# Patient Record
Sex: MALE | URBAN - METROPOLITAN AREA
[De-identification: names, ages, dates, MRNs, and addresses within clinical notes are randomized per-mention and may not be internally consistent; named-entity substitution may affect disease eponyms.]

---

## 2021-06-17 LAB — SARS-COV-2, NAA: DETECTED

## 2021-06-22 ENCOUNTER — HOSPITAL ENCOUNTER (INPATIENT)
Age: 56
LOS: 6 days | Discharge: HOME | DRG: 177 | End: 2021-06-28
Attending: EMERGENCY MEDICINE | Admitting: INTERNAL MEDICINE
Payer: COMMERCIAL

## 2021-06-22 ENCOUNTER — APPOINTMENT (OUTPATIENT)
Dept: GENERAL RADIOLOGY | Age: 56
DRG: 177 | End: 2021-06-22
Attending: EMERGENCY MEDICINE
Payer: COMMERCIAL

## 2021-06-22 ENCOUNTER — APPOINTMENT (OUTPATIENT)
Dept: CT IMAGING | Age: 56
DRG: 177 | End: 2021-06-22
Attending: EMERGENCY MEDICINE
Payer: COMMERCIAL

## 2021-06-22 DIAGNOSIS — R79.89 POSITIVE D DIMER: ICD-10-CM

## 2021-06-22 DIAGNOSIS — R09.02 HYPOXIA: Primary | ICD-10-CM

## 2021-06-22 DIAGNOSIS — U07.1 COVID-19 VIRUS DETECTED: ICD-10-CM

## 2021-06-22 LAB
ALBUMIN SERPL-MCNC: 3.3 G/DL (ref 3.5–5)
ALBUMIN/GLOB SERPL: 0.9 {RATIO} (ref 1.1–2.2)
ALP SERPL-CCNC: 48 U/L (ref 45–117)
ALT SERPL-CCNC: 64 U/L (ref 12–78)
ANION GAP SERPL CALC-SCNC: 7 MMOL/L (ref 5–15)
APPEARANCE UR: CLEAR
AST SERPL-CCNC: 49 U/L (ref 15–37)
BACTERIA URNS QL MICRO: NEGATIVE /HPF
BASE DEFICIT BLD-SCNC: 1.2 MMOL/L
BASOPHILS # BLD: 0 K/UL (ref 0–0.1)
BASOPHILS NFR BLD: 0 % (ref 0–1)
BILIRUB SERPL-MCNC: 0.7 MG/DL (ref 0.2–1)
BILIRUB UR QL: NEGATIVE
BUN SERPL-MCNC: 11 MG/DL (ref 6–20)
BUN/CREAT SERPL: 12 (ref 12–20)
CA-I BLD-MCNC: 1.14 MMOL/L (ref 1.12–1.32)
CALCIUM SERPL-MCNC: 8.1 MG/DL (ref 8.5–10.1)
CHLORIDE BLD-SCNC: 98 MMOL/L (ref 100–108)
CHLORIDE SERPL-SCNC: 100 MMOL/L (ref 97–108)
CK SERPL-CCNC: 152 U/L (ref 39–308)
CO2 BLD-SCNC: 24 MMOL/L (ref 19–24)
CO2 SERPL-SCNC: 25 MMOL/L (ref 21–32)
COLOR UR: ABNORMAL
CREAT SERPL-MCNC: 0.95 MG/DL (ref 0.7–1.3)
CREAT UR-MCNC: 0.9 MG/DL (ref 0.6–1.3)
CRP SERPL-MCNC: 3.28 MG/DL (ref 0–0.6)
D DIMER PPP FEU-MCNC: 0.87 MG/L FEU (ref 0–0.65)
DIFFERENTIAL METHOD BLD: ABNORMAL
EOSINOPHIL # BLD: 0 K/UL (ref 0–0.4)
EOSINOPHIL NFR BLD: 0 % (ref 0–7)
EPITH CASTS URNS QL MICRO: ABNORMAL /LPF
ERYTHROCYTE [DISTWIDTH] IN BLOOD BY AUTOMATED COUNT: 12.3 % (ref 11.5–14.5)
GLOBULIN SER CALC-MCNC: 3.8 G/DL (ref 2–4)
GLUCOSE BLD STRIP.AUTO-MCNC: 175 MG/DL (ref 65–117)
GLUCOSE BLD STRIP.AUTO-MCNC: 209 MG/DL (ref 74–106)
GLUCOSE BLD STRIP.AUTO-MCNC: 263 MG/DL (ref 65–117)
GLUCOSE BLD STRIP.AUTO-MCNC: 317 MG/DL (ref 65–117)
GLUCOSE SERPL-MCNC: 184 MG/DL (ref 65–100)
GLUCOSE UR STRIP.AUTO-MCNC: NEGATIVE MG/DL
HCO3 BLDA-SCNC: 24 MMOL/L
HCT VFR BLD AUTO: 45.6 % (ref 36.6–50.3)
HGB BLD-MCNC: 15.4 G/DL (ref 12.1–17)
HGB UR QL STRIP: NEGATIVE
IMM GRANULOCYTES # BLD AUTO: 0.1 K/UL (ref 0–0.04)
IMM GRANULOCYTES NFR BLD AUTO: 1 % (ref 0–0.5)
KETONES UR QL STRIP.AUTO: ABNORMAL MG/DL
LACTATE BLD-SCNC: 1.61 MMOL/L (ref 0.4–2)
LEUKOCYTE ESTERASE UR QL STRIP.AUTO: NEGATIVE
LYMPHOCYTES # BLD: 2.3 K/UL (ref 0.8–3.5)
LYMPHOCYTES NFR BLD: 33 % (ref 12–49)
MCH RBC QN AUTO: 30.2 PG (ref 26–34)
MCHC RBC AUTO-ENTMCNC: 33.8 G/DL (ref 30–36.5)
MCV RBC AUTO: 89.4 FL (ref 80–99)
MONOCYTES # BLD: 0.7 K/UL (ref 0–1)
MONOCYTES NFR BLD: 10 % (ref 5–13)
NEUTS SEG # BLD: 3.9 K/UL (ref 1.8–8)
NEUTS SEG NFR BLD: 56 % (ref 32–75)
NITRITE UR QL STRIP.AUTO: NEGATIVE
NRBC # BLD: 0 K/UL (ref 0–0.01)
NRBC BLD-RTO: 0 PER 100 WBC
PCO2 BLDV: 39.1 MMHG (ref 41–51)
PH BLDV: 7.39 [PH] (ref 7.32–7.42)
PH UR STRIP: 6.5 [PH] (ref 5–8)
PLATELET # BLD AUTO: 151 K/UL (ref 150–400)
PMV BLD AUTO: 10.6 FL (ref 8.9–12.9)
PO2 BLDV: 29 MMHG (ref 25–40)
POTASSIUM BLD-SCNC: 3.9 MMOL/L (ref 3.5–5.5)
POTASSIUM SERPL-SCNC: 3.9 MMOL/L (ref 3.5–5.1)
PROCALCITONIN SERPL-MCNC: <0.05 NG/ML
PROT SERPL-MCNC: 7.1 G/DL (ref 6.4–8.2)
PROT UR STRIP-MCNC: 30 MG/DL
RBC # BLD AUTO: 5.1 M/UL (ref 4.1–5.7)
RBC #/AREA URNS HPF: ABNORMAL /HPF (ref 0–5)
SERVICE CMNT-IMP: ABNORMAL
SODIUM BLD-SCNC: 134 MMOL/L (ref 136–145)
SODIUM SERPL-SCNC: 132 MMOL/L (ref 136–145)
SP GR UR REFRACTOMETRY: <1.005 (ref 1–1.03)
SPECIMEN SITE: ABNORMAL
TROPONIN I SERPL-MCNC: <0.05 NG/ML
UA: UC IF INDICATED,UAUC: ABNORMAL
UROBILINOGEN UR QL STRIP.AUTO: 0.2 EU/DL (ref 0.2–1)
WBC # BLD AUTO: 6.9 K/UL (ref 4.1–11.1)
WBC URNS QL MICRO: ABNORMAL /HPF (ref 0–4)

## 2021-06-22 PROCEDURE — 93005 ELECTROCARDIOGRAM TRACING: CPT

## 2021-06-22 PROCEDURE — 87040 BLOOD CULTURE FOR BACTERIA: CPT

## 2021-06-22 PROCEDURE — 86140 C-REACTIVE PROTEIN: CPT

## 2021-06-22 PROCEDURE — 85025 COMPLETE CBC W/AUTO DIFF WBC: CPT

## 2021-06-22 PROCEDURE — 71045 X-RAY EXAM CHEST 1 VIEW: CPT

## 2021-06-22 PROCEDURE — 84484 ASSAY OF TROPONIN QUANT: CPT

## 2021-06-22 PROCEDURE — 74011636637 HC RX REV CODE- 636/637: Performed by: INTERNAL MEDICINE

## 2021-06-22 PROCEDURE — 84295 ASSAY OF SERUM SODIUM: CPT

## 2021-06-22 PROCEDURE — 74011250636 HC RX REV CODE- 250/636: Performed by: EMERGENCY MEDICINE

## 2021-06-22 PROCEDURE — 99285 EMERGENCY DEPT VISIT HI MDM: CPT

## 2021-06-22 PROCEDURE — 36415 COLL VENOUS BLD VENIPUNCTURE: CPT

## 2021-06-22 PROCEDURE — 74011250637 HC RX REV CODE- 250/637: Performed by: INTERNAL MEDICINE

## 2021-06-22 PROCEDURE — 85379 FIBRIN DEGRADATION QUANT: CPT

## 2021-06-22 PROCEDURE — XW033E5 INTRODUCTION OF REMDESIVIR ANTI-INFECTIVE INTO PERIPHERAL VEIN, PERCUTANEOUS APPROACH, NEW TECHNOLOGY GROUP 5: ICD-10-PCS | Performed by: INTERNAL MEDICINE

## 2021-06-22 PROCEDURE — 65270000029 HC RM PRIVATE

## 2021-06-22 PROCEDURE — 82962 GLUCOSE BLOOD TEST: CPT

## 2021-06-22 PROCEDURE — 96375 TX/PRO/DX INJ NEW DRUG ADDON: CPT

## 2021-06-22 PROCEDURE — 82550 ASSAY OF CK (CPK): CPT

## 2021-06-22 PROCEDURE — 96374 THER/PROPH/DIAG INJ IV PUSH: CPT

## 2021-06-22 PROCEDURE — 74011000636 HC RX REV CODE- 636: Performed by: EMERGENCY MEDICINE

## 2021-06-22 PROCEDURE — 81001 URINALYSIS AUTO W/SCOPE: CPT

## 2021-06-22 PROCEDURE — 74011250636 HC RX REV CODE- 250/636: Performed by: INTERNAL MEDICINE

## 2021-06-22 PROCEDURE — 87449 NOS EACH ORGANISM AG IA: CPT

## 2021-06-22 PROCEDURE — 71275 CT ANGIOGRAPHY CHEST: CPT

## 2021-06-22 PROCEDURE — 84145 PROCALCITONIN (PCT): CPT

## 2021-06-22 PROCEDURE — 80053 COMPREHEN METABOLIC PANEL: CPT

## 2021-06-22 RX ORDER — DEXAMETHASONE 4 MG/1
6 TABLET ORAL DAILY
Status: DISCONTINUED | OUTPATIENT
Start: 2021-06-22 | End: 2021-06-23

## 2021-06-22 RX ORDER — SODIUM CHLORIDE 0.9 % (FLUSH) 0.9 %
5-10 SYRINGE (ML) INJECTION AS NEEDED
Status: DISCONTINUED | OUTPATIENT
Start: 2021-06-22 | End: 2021-06-26

## 2021-06-22 RX ORDER — LEVOTHYROXINE SODIUM 88 UG/1
88 TABLET ORAL DAILY
COMMUNITY

## 2021-06-22 RX ORDER — ONDANSETRON 4 MG/1
4 TABLET, ORALLY DISINTEGRATING ORAL
Status: DISCONTINUED | OUTPATIENT
Start: 2021-06-22 | End: 2021-06-28 | Stop reason: HOSPADM

## 2021-06-22 RX ORDER — ASCORBIC ACID 500 MG
1000 TABLET ORAL DAILY
COMMUNITY

## 2021-06-22 RX ORDER — INSULIN LISPRO 100 [IU]/ML
INJECTION, SOLUTION INTRAVENOUS; SUBCUTANEOUS
Status: DISCONTINUED | OUTPATIENT
Start: 2021-06-22 | End: 2021-06-28 | Stop reason: HOSPADM

## 2021-06-22 RX ORDER — MELATONIN
1000 DAILY
Status: DISCONTINUED | OUTPATIENT
Start: 2021-06-22 | End: 2021-06-28 | Stop reason: HOSPADM

## 2021-06-22 RX ORDER — ACETAMINOPHEN 325 MG/1
650 TABLET ORAL
Status: DISCONTINUED | OUTPATIENT
Start: 2021-06-22 | End: 2021-06-28 | Stop reason: HOSPADM

## 2021-06-22 RX ORDER — ONDANSETRON 2 MG/ML
4 INJECTION INTRAMUSCULAR; INTRAVENOUS
Status: DISCONTINUED | OUTPATIENT
Start: 2021-06-22 | End: 2021-06-28 | Stop reason: HOSPADM

## 2021-06-22 RX ORDER — ATORVASTATIN CALCIUM 40 MG/1
40 TABLET, FILM COATED ORAL
Status: DISCONTINUED | OUTPATIENT
Start: 2021-06-22 | End: 2021-06-28 | Stop reason: HOSPADM

## 2021-06-22 RX ORDER — DIPHENHYDRAMINE HYDROCHLORIDE 50 MG/ML
25 INJECTION, SOLUTION INTRAMUSCULAR; INTRAVENOUS
Status: COMPLETED | OUTPATIENT
Start: 2021-06-22 | End: 2021-06-22

## 2021-06-22 RX ORDER — DEXTROSE 50 % IN WATER (D50W) INTRAVENOUS SYRINGE
12.5-25 AS NEEDED
Status: DISCONTINUED | OUTPATIENT
Start: 2021-06-22 | End: 2021-06-28 | Stop reason: HOSPADM

## 2021-06-22 RX ORDER — DEXAMETHASONE SODIUM PHOSPHATE 4 MG/ML
10 INJECTION, SOLUTION INTRA-ARTICULAR; INTRALESIONAL; INTRAMUSCULAR; INTRAVENOUS; SOFT TISSUE ONCE
Status: COMPLETED | OUTPATIENT
Start: 2021-06-22 | End: 2021-06-22

## 2021-06-22 RX ORDER — ATORVASTATIN CALCIUM 20 MG/1
20 TABLET, FILM COATED ORAL DAILY
COMMUNITY

## 2021-06-22 RX ORDER — ENOXAPARIN SODIUM 100 MG/ML
40 INJECTION SUBCUTANEOUS EVERY 24 HOURS
Status: DISCONTINUED | OUTPATIENT
Start: 2021-06-22 | End: 2021-06-28 | Stop reason: HOSPADM

## 2021-06-22 RX ORDER — MAGNESIUM SULFATE 100 %
4 CRYSTALS MISCELLANEOUS AS NEEDED
Status: DISCONTINUED | OUTPATIENT
Start: 2021-06-22 | End: 2021-06-28 | Stop reason: HOSPADM

## 2021-06-22 RX ORDER — SERTRALINE HYDROCHLORIDE 50 MG/1
50 TABLET, FILM COATED ORAL DAILY
Status: DISCONTINUED | OUTPATIENT
Start: 2021-06-22 | End: 2021-06-22

## 2021-06-22 RX ORDER — ASPIRIN 81 MG/1
81 TABLET ORAL DAILY
Status: DISCONTINUED | OUTPATIENT
Start: 2021-06-23 | End: 2021-06-28 | Stop reason: HOSPADM

## 2021-06-22 RX ORDER — METOCLOPRAMIDE HYDROCHLORIDE 5 MG/ML
10 INJECTION INTRAMUSCULAR; INTRAVENOUS
Status: COMPLETED | OUTPATIENT
Start: 2021-06-22 | End: 2021-06-22

## 2021-06-22 RX ORDER — BUTALBITAL, ACETAMINOPHEN AND CAFFEINE 50; 325; 40 MG/1; MG/1; MG/1
1 TABLET ORAL
Status: DISCONTINUED | OUTPATIENT
Start: 2021-06-22 | End: 2021-06-28 | Stop reason: HOSPADM

## 2021-06-22 RX ORDER — ACETAMINOPHEN 650 MG/1
650 SUPPOSITORY RECTAL
Status: DISCONTINUED | OUTPATIENT
Start: 2021-06-22 | End: 2021-06-28 | Stop reason: HOSPADM

## 2021-06-22 RX ORDER — POLYETHYLENE GLYCOL 3350 17 G/17G
17 POWDER, FOR SOLUTION ORAL DAILY PRN
Status: DISCONTINUED | OUTPATIENT
Start: 2021-06-22 | End: 2021-06-28 | Stop reason: HOSPADM

## 2021-06-22 RX ORDER — ASCORBIC ACID 500 MG
250 TABLET ORAL 2 TIMES DAILY
Status: DISCONTINUED | OUTPATIENT
Start: 2021-06-22 | End: 2021-06-28 | Stop reason: HOSPADM

## 2021-06-22 RX ORDER — LEVOTHYROXINE SODIUM 88 UG/1
88 TABLET ORAL
Status: DISCONTINUED | OUTPATIENT
Start: 2021-06-23 | End: 2021-06-28 | Stop reason: HOSPADM

## 2021-06-22 RX ORDER — ASPIRIN 81 MG/1
81 TABLET ORAL DAILY
COMMUNITY

## 2021-06-22 RX ORDER — METFORMIN HYDROCHLORIDE 500 MG/1
500 TABLET ORAL DAILY
Status: ON HOLD | COMMUNITY
End: 2021-06-28 | Stop reason: SDUPTHER

## 2021-06-22 RX ORDER — ZINC SULFATE 50(220)MG
50 CAPSULE ORAL DAILY
Status: DISCONTINUED | OUTPATIENT
Start: 2021-06-22 | End: 2021-06-28 | Stop reason: HOSPADM

## 2021-06-22 RX ORDER — SODIUM CHLORIDE 0.9 % (FLUSH) 0.9 %
5-40 SYRINGE (ML) INJECTION AS NEEDED
Status: DISCONTINUED | OUTPATIENT
Start: 2021-06-22 | End: 2021-06-28 | Stop reason: HOSPADM

## 2021-06-22 RX ORDER — SODIUM CHLORIDE 0.9 % (FLUSH) 0.9 %
5-40 SYRINGE (ML) INJECTION EVERY 8 HOURS
Status: DISCONTINUED | OUTPATIENT
Start: 2021-06-22 | End: 2021-06-28 | Stop reason: HOSPADM

## 2021-06-22 RX ORDER — LANOLIN ALCOHOL/MO/W.PET/CERES
1000 CREAM (GRAM) TOPICAL DAILY
COMMUNITY

## 2021-06-22 RX ORDER — KETOROLAC TROMETHAMINE 30 MG/ML
30 INJECTION, SOLUTION INTRAMUSCULAR; INTRAVENOUS
Status: COMPLETED | OUTPATIENT
Start: 2021-06-22 | End: 2021-06-22

## 2021-06-22 RX ADMIN — IOPAMIDOL 100 ML: 755 INJECTION, SOLUTION INTRAVENOUS at 10:21

## 2021-06-22 RX ADMIN — SODIUM CHLORIDE 983 ML: 9 INJECTION, SOLUTION INTRAVENOUS at 11:05

## 2021-06-22 RX ADMIN — KETOROLAC TROMETHAMINE 30 MG: 30 INJECTION, SOLUTION INTRAMUSCULAR; INTRAVENOUS at 08:59

## 2021-06-22 RX ADMIN — OXYCODONE HYDROCHLORIDE AND ACETAMINOPHEN 250 MG: 500 TABLET ORAL at 11:04

## 2021-06-22 RX ADMIN — Medication 10 ML: at 22:33

## 2021-06-22 RX ADMIN — ENOXAPARIN SODIUM 40 MG: 40 INJECTION SUBCUTANEOUS at 12:44

## 2021-06-22 RX ADMIN — SODIUM CHLORIDE 1000 ML: 9 INJECTION, SOLUTION INTRAVENOUS at 08:57

## 2021-06-22 RX ADMIN — INSULIN LISPRO 2 UNITS: 100 INJECTION, SOLUTION INTRAVENOUS; SUBCUTANEOUS at 12:44

## 2021-06-22 RX ADMIN — INSULIN LISPRO 4 UNITS: 100 INJECTION, SOLUTION INTRAVENOUS; SUBCUTANEOUS at 22:33

## 2021-06-22 RX ADMIN — CHOLECALCIFEROL TAB 25 MCG (1000 UNIT) 1000 UNITS: 25 TAB at 12:45

## 2021-06-22 RX ADMIN — DEXAMETHASONE SODIUM PHOSPHATE 10 MG: 4 INJECTION, SOLUTION INTRAMUSCULAR; INTRAVENOUS at 08:58

## 2021-06-22 RX ADMIN — ZINC SULFATE 220 MG (50 MG) CAPSULE 50 MG: CAPSULE at 16:22

## 2021-06-22 RX ADMIN — DEXAMETHASONE 6 MG: 4 TABLET ORAL at 11:04

## 2021-06-22 RX ADMIN — INSULIN LISPRO 5 UNITS: 100 INJECTION, SOLUTION INTRAVENOUS; SUBCUTANEOUS at 16:47

## 2021-06-22 RX ADMIN — METOCLOPRAMIDE 10 MG: 5 INJECTION, SOLUTION INTRAMUSCULAR; INTRAVENOUS at 08:58

## 2021-06-22 RX ADMIN — DIPHENHYDRAMINE HYDROCHLORIDE 25 MG: 50 INJECTION, SOLUTION INTRAMUSCULAR; INTRAVENOUS at 08:58

## 2021-06-22 RX ADMIN — Medication 10 ML: at 14:00

## 2021-06-22 RX ADMIN — ATORVASTATIN CALCIUM 40 MG: 40 TABLET, FILM COATED ORAL at 22:33

## 2021-06-22 RX ADMIN — ACETAMINOPHEN 650 MG: 325 TABLET ORAL at 16:49

## 2021-06-22 NOTE — ED NOTES
TRANSFER - OUT REPORT:    Verbal report given to Darrian Henson (name) on Cindi Klinefelter  being transferred to [facility] (unit) for routine progression of care       Report consisted of patients Situation, Background, Assessment and   Recommendations(SBAR). Information from the following report(s) SBAR, Kardex, ED Summary and MAR was reviewed with the receiving nurse. Lines:   Peripheral IV 06/22/21 Right Antecubital (Active)   Site Assessment Clean, dry, & intact 06/22/21 0817   Phlebitis Assessment 0 06/22/21 0817   Infiltration Assessment 0 06/22/21 0817   Dressing Status Clean, dry, & intact 06/22/21 0817   Hub Color/Line Status Pink 06/22/21 0817   Action Taken Blood drawn 06/22/21 0817       Peripheral IV 06/22/21 Left Antecubital (Active)        Opportunity for questions and clarification was provided.       Patient transported with:   O2 @ 2 liters

## 2021-06-22 NOTE — PROGRESS NOTES
End of Shift Note    Bedside shift change report given to Noam Hall RN(oncoming nurse) by Vanessa Ernst (offgoing nurse). Report included the following information SBAR, Kardex, Intake/Output, MAR and Recent Results    Shift worked:  [id number]     Shift summary and any significant changes:     Pt states headache has resolved since adm to Med[facility]Tele. O2 sats~95% on 2L/NC. Pt c/o SOB without oxygen     Concerns for physician to address:       Zone phone for oncoming shift:          Activity:  Activity Level: Up with Assistance  Number times ambulated in hallways past shift: 0  Number of times OOB to chair past shift: 0    Cardiac:   Cardiac Monitoring: No      Cardiac Rhythm: Sinus Tach    Access:   Current line(s): PIV     Genitourinary:   Urinary status: voiding    Respiratory:   O2 Device: Nasal cannula  Chronic home O2 use?: NO  Incentive spirometer at bedside: NO     GI:  Last Bowel Movement Date: 06/22/21  Current diet:  ADULT DIET Regular  Passing flatus: Tolerating current diet: YES       Pain Management:   Patient states pain is manageable on current regimen: YES    Skin:  Keven Score: 22  Interventions: increase time out of bed and nutritional support     Patient Safety:  Fall Score:  Total Score: 1  Interventions: gripper socks and stay with me (per policy)       Length of Stay:  Expected LOS: 5d 9h  Actual LOS: 0      Vanessa Ernst

## 2021-06-22 NOTE — ED PROVIDER NOTES
EMERGENCY DEPARTMENT HISTORY AND PHYSICAL EXAM      Date: 6/22/2021  Patient Name: Sunny Bowen    History of Presenting Illness     Chief Complaint   Patient presents with    Positive For Covid-19     Sx started monday. [facility] and was dx with Covid. He comes in today for worsening sob and worsening headache.  Shortness of Breath    Headache       History Provided By: Patient    HPI: Sunny Bowen, 64 y.o. male presents to the ED with no history of receiving a COVID-19 vaccine with URI symptoms starting approximately 1 week prior. 3 days later he went [facility] a [facility] and was diagnosed as having COVID-19. His symptoms worsened over the course of the next few days with worsening shortness of breath, myalgia and worsening headache, prompting visit to emergency department today. He presents to our emergency department 88% on room air with increased respiratory rate. He is a current everyday smoker but with no known specific exposure to COVID-19 that he is aware of. There are no other complaints, changes, or physical findings at this time. PCP: None    No current facility-administered medications on file prior to encounter. Current Outpatient Medications on File Prior to Encounter   Medication Sig Dispense Refill    multivitamin (ONE A DAY) tablet Take 1 Tab by mouth daily.  DOCOSAHEXANOIC ACID/EPA (FISH OIL PO) Take  by mouth.  levothyroxine (LEVOTHROID) 75 mcg tablet Take  by mouth Daily (before breakfast).  sertraline (ZOLOFT) 50 mg tablet Take  by mouth daily.  Cholecalciferol, Vitamin D3, (VITAMIN D3) 1,000 unit cap Take  by mouth.          Past History     Past Medical History:  Past Medical History:   Diagnosis Date    Hypothyroidism     Mood disorder ([facility] [address]        Past Surgical History:  Past Surgical History:   Procedure Laterality Date    HX KNEE ARTHROSCOPY      HX SHOULDER ARTHROSCOPY      HX TONSILLECTOMY Family History:  Family History   Problem Relation Age of Onset    Heart Disease Unknown     Diabetes Unknown     Depression Unknown        Social History:  Social History     Tobacco Use    Smoking status: Current Every Day Smoker    Smokeless tobacco: Never Used   Substance Use Topics    Alcohol use: No    Drug use: Not on file       Allergies:  No Known Allergies      Review of Systems   Review of Systems   Constitutional: Positive for activity change, appetite change, fatigue and fever. HENT: Negative. Respiratory: Positive for cough, chest tightness and shortness of breath. Gastrointestinal: Negative. Genitourinary: Negative. Musculoskeletal: Positive for myalgias. Negative for back pain. Skin: Negative for rash. Neurological: Positive for headaches. All other systems reviewed and are negative. Physical Exam   Physical Exam   Vital signs and nursing notes reviewed    CONSTITUTIONAL: Alert, in mild distress; well-developed; well-nourished. Appears fatigued. HEAD:  Normocephalic, atraumatic  EYES: PERRL; EOM's intact. ENTM: Nose: no rhinorrhea; Throat: no erythema or exudate, mucous membranes moist  Neck:  Supple. trachea is midline. RESP: Respiratory rate 24, increased work of breathing, 89% on room air  CV: S1 and S2 WNL; No murmurs, gallops or rubs. 2+ radial and DP pulses bilaterally. GI: non-distended, normal bowel sounds, abdomen soft and non-tender. No masses or organomegaly. : No costo-vertebral angle tenderness. BACK:  Non-tender, normal appearance  UPPER EXT:  Normal inspection. no joint or soft tissue swelling  LOWER EXT: No edema, no calf tenderness. NEURO: Alert and oriented x3, 5/5 strength and light touch sensation intact in bilateral upper and lower extremities. SKIN: No rashes;  Warm and dry  PSYCH: Normal mood, normal affect    Diagnostic Study Results     Labs -     Recent Results (from the past 12 hour(s))   CBC WITH AUTOMATED DIFF    Collection Time: 06/22/21  8:13 AM   Result Value Ref Range    WBC 6.9 4.1 - 11.1 K/uL    RBC 5.10 4. 10 - 5.70 M/uL    HGB 15.4 12.1 - 17.0 g/dL    HCT 45.6 36.6 - 50.3 %    MCV 89.4 80.0 - 99.0 FL    MCH 30.2 26.0 - 34.0 PG    MCHC 33.8 30.0 - 36.5 g/dL    RDW 12.3 11.5 - 14.5 %    PLATELET 144 737 - 645 K/uL    MPV 10.6 8.9 - 12.9 FL    NRBC 0.0 0  WBC    ABSOLUTE NRBC 0.00 0.00 - 0.01 K/uL    NEUTROPHILS 56 32 - 75 %    LYMPHOCYTES 33 12 - 49 %    MONOCYTES 10 5 - 13 %    EOSINOPHILS 0 0 - 7 %    BASOPHILS 0 0 - 1 %    IMMATURE GRANULOCYTES 1 (H) 0.0 - 0.5 %    ABS. NEUTROPHILS 3.9 1.8 - 8.0 K/UL    ABS. LYMPHOCYTES 2.3 0.8 - 3.5 K/UL    ABS. MONOCYTES 0.7 0.0 - 1.0 K/UL    ABS. EOSINOPHILS 0.0 0.0 - 0.4 K/UL    ABS. BASOPHILS 0.0 0.0 - 0.1 K/UL    ABS. IMM. GRANS. 0.1 (H) 0.00 - 0.04 K/UL    DF AUTOMATED     METABOLIC PANEL, COMPREHENSIVE    Collection Time: 06/22/21  8:13 AM   Result Value Ref Range    Sodium 132 (L) 136 - 145 mmol/L    Potassium 3.9 3.5 - 5.1 mmol/L    Chloride 100 97 - 108 mmol/L    CO2 25 21 - 32 mmol/L    Anion gap 7 5 - 15 mmol/L    Glucose 184 (H) 65 - 100 mg/dL    BUN 11 6 - 20 MG/DL    Creatinine 0.95 0.70 - 1.30 MG/DL    BUN/Creatinine ratio 12 12 - 20      GFR est AA >60 >60 ml/min/1.73m2    GFR est non-AA >60 >60 ml/min/1.73m2    Calcium 8.1 (L) 8.5 - 10.1 MG/DL    Bilirubin, total 0.7 0.2 - 1.0 MG/DL    ALT (SGPT) 64 12 - 78 U/L    AST (SGOT) 49 (H) 15 - 37 U/L    Alk.  phosphatase 48 45 - 117 U/L    Protein, total 7.1 6.4 - 8.2 g/dL    Albumin 3.3 (L) 3.5 - 5.0 g/dL    Globulin 3.8 2.0 - 4.0 g/dL    A-G Ratio 0.9 (L) 1.1 - 2.2     CK W/ REFLX CKMB    Collection Time: 06/22/21  8:13 AM   Result Value Ref Range     39 - 308 U/L   TROPONIN I    Collection Time: 06/22/21  8:13 AM   Result Value Ref Range    Troponin-I, Qt. <0.05 <0.05 ng/mL   D DIMER    Collection Time: 06/22/21  8:30 AM   Result Value Ref Range    D-dimer 0.87 (H) 0.00 - 0.65 mg/L FEU   BLOOD GAS,CHEM8,LACTIC ACID POC    Collection Time: 06/22/21  8:36 AM   Result Value Ref Range    Calcium, ionized (POC) 1.14 1.12 - 1.32 mmol/L    BICARBONATE 24 mmol/L    Base deficit (POC) 1.2 mmol/L    Sample source VENOUS BLOOD      CO2, POC 24 19 - 24 MMOL/L    Sodium,  (L) 136 - 145 MMOL/L    Potassium, POC 3.9 3.5 - 5.5 MMOL/L    Chloride, POC 98 (L) 100 - 108 MMOL/L    Glucose,  (H) 74 - 106 MG/DL    Creatinine, POC 0.9 0.6 - 1.3 MG/DL    Lactic Acid (POC) 1.61 0.40 - 2.00 mmol/L    pH, venous (POC) 7.39 7.32 - 7.42      pCO2, venous (POC) 39.1 (L) 41 - 51 MMHG    pO2, venous (POC) 29 25 - 40 mmHg       Radiologic Studies -   XR CHEST PORT   Final Result   Vague parenchymal opacity projects over the right upper lung field. While this may be of osseous origin, pulmonary nodule cannot be excluded. Correlation with nonemergent chest CT is recommended. No acute abnormality is   identified. CTA CHEST W OR W WO CONT    (Results Pending)     CT Results  (Last 48 hours)    None        CXR Results  (Last 48 hours)               06/22/21 0834  XR CHEST PORT Final result    Impression:  Vague parenchymal opacity projects over the right upper lung field. While this may be of osseous origin, pulmonary nodule cannot be excluded. Correlation with nonemergent chest CT is recommended. No acute abnormality is   identified. Narrative: Indication: Shortness of breath       Comparison: None       Portable exam of the chest obtained at 822 demonstrates normal heart size. There   is a vague parenchymal opacity which projects over the right upper lung field. There is no acute process in the lung fields. The osseous structures are   unremarkable. Medical Decision Making   I am the first provider for this patient. I reviewed the vital signs, available nursing notes, past medical history, past surgical history, family history and social history.     Vital Signs-Reviewed the patient's vital signs. Patient Vitals for the past 12 hrs:   Temp Pulse Resp BP SpO2   06/22/21 0807     (!) 89 %   06/22/21 0803 98.7 °F (37.1 °C) (!) 121 16 127/89 96 %       EKG interpretation: (Preliminary)  EKG performed at 8:07 AM shows a sinus tachycardia at a rate of 114, normal axis, normal intervals, T wave inversion present in 2 3 and aVF. No old EKG to compare to. Records Reviewed: Nursing notes    Provider Notes (Medical Decision Making):   40-year-old male with findings consistent with COVID-19 infection due to previous diagnosis and symptomatology as well as hypoxia requiring hospital admission. Patient did get imaging to rule out PE given increased risk. Plan for oxygen support and admission. ED Course:   Initial assessment performed. The patients presenting problems have been discussed, and they are in agreement with the care plan formulated and outlined with them. I have encouraged them to ask questions as they arise throughout their visit. Disposition:  Admit    Admit Note:  10:16 AM  Pt is being admitted by Pamela Leong. The results of their tests and reason(s) for their admission have been discussed with pt and/or available family. They convey agreement and understanding for the need to be admitted and for admission diagnosis. Diagnosis     Clinical Impression:   1. Hypoxia    2. COVID-19 virus detected    3. Positive D dimer        Attestations:    Mariela Abbasi MD    Please note that this dictation was completed with [employer], the computer voice recognition software. Quite often unanticipated grammatical, syntax, homophones, and other interpretive errors are inadvertently transcribed by the computer software. Please disregard these errors. Please excuse any errors that have escaped final proofreading. Thank you.

## 2021-06-22 NOTE — PROGRESS NOTES
Pharmacy Clarification of the Prior to Admission Medication Regimen Retrospective to the Admission Medication Reconciliation    The patient was interviewed by phone regarding clarification of the prior to admission medication regimen. He was questioned regarding use of any other inhalers, topical products, over the counter medications, herbal medications, vitamin products or ophthalmic/nasal/otic medication use. Information Obtained From: patient, [facility]    Recommendations/Findings: The following amendments were made to the patient's active medication list on file at [facility]:     1) Additions:   Metformin  Atorvastatin  Aspirin    2) Removals:   Zoloft  Fish oil  Multi vit      3) Changes:  levothyroxine (Old regimen: 75 mcg daily /New regimen: 88 mcg daily)      4) Pertinent Pharmacy Findings: per patient he takes all medications at night     Updated patients preferred outpatient pharmacy to: [facility]         PTA medication list was corrected to the following:     Prior to Admission Medications   Prescriptions Last Dose Informant Taking? Cholecalciferol, Vitamin D3, (VITAMIN D3) 1,000 unit cap 6/21/2021 Self Yes   Sig: Take 1,000 Units by mouth daily. ascorbic acid, vitamin C, (Vitamin C) 500 mg tablet 6/21/2021 Self Yes   Sig: Take 1,000 mg by mouth daily. aspirin delayed-release 81 mg tablet 6/21/2021 Self Yes   Sig: Take 81 mg by mouth daily. atorvastatin (LIPITOR) 20 mg tablet 6/21/2021 Self Yes   Sig: Take 20 mg by mouth daily. cyanocobalamin 1,000 mcg tablet 6/21/2021 Self Yes   Sig: Take 1,000 mcg by mouth daily. levothyroxine (SYNTHROID) 88 mcg tablet 6/21/2021 Self Yes   Sig: Take 88 mcg by mouth daily. metFORMIN (GLUCOPHAGE) 500 mg tablet 6/21/2021 Self Yes   Sig: Take 500 mg by mouth daily.       Facility-Administered Medications: None        Thank you,  Lynne Mcgarry [facility]  Medication History Pharmacy Technician

## 2021-06-22 NOTE — ED NOTES
Gradually worsening fatigue, SOB, cough over past week. Tested positive for COVID on Thursday. Poor appetite, vomited once yesterday. Also c/o severe headache. Room air sats at 89% on room air, placed on 2L via NC, sats up to 96%.

## 2021-06-22 NOTE — H&P
Hospitalist Admission Note    NAME: Cindi Klinefelter   :  1965   MRN:  [id number]     Date/Time:  2021 10:43 AM    Patient PCP: None  ______________________________________________________________________  Given the patient's current clinical presentation, I have a high level of concern for decompensation if discharged from the emergency department. Complex decision making was performed, which includes reviewing the patient's available past medical records, laboratory results, and x-ray films. My assessment of this patient's clinical condition and my plan of care is as follows. Assessment / Plan:  COVID-19 pneumonia POA  Hypoxia POA  Headache POA    -Patient reports her symptoms started last Monday and was tested positive for COVID-19 infection on Thursday at [place]  -He presented with a symptoms of headache and shortness of breath  -CTA showed patchy infiltrates suggestive of COVID-19 pneumonia  -Oxygen saturation was 89% on room air  -Admit to the medical floor  -Start Covid specific protocol  -Start dexamethasone 6 mg daily  -Start vitamin C and zinc  -Continue statin, and aspirin 81 mg daily  -Lovenox medium dose  -According to the recent guidelines no benefit for remdesivir  -We will defer remdesivir use at this point as onset of symptoms is more than a week ago  -Droplet plus isolation    Non-insulin-dependent diabetes  -Use sliding scale insulin for now  -Patient reports he was recently started on diabetic medications and does not remember the name  -Pharmacy consult for home med reconciliation    Hypothyroidism  -Continue levothyroxine      Chronic anxiety and depression  -Continue Zoloft      Code Status: Patient wishes to be full code  Surrogate Decision Maker: He wants to designate his girlfriend Christine is a surrogate POA    DVT Prophylaxis: Lovenox subcu  GI Prophylaxis: not indicated    Baseline:  Independent      Subjective:   CHIEF COMPLAINT: Shortness of breath and headache    HISTORY OF PRESENT ILLNESS:     Donnie Atkins is a 64 y.o.  male with past medical history significant for non-insulin-dependent diabetes, dyslipidemia, hypothyroidism and chronic anxiety who presents to the ED with a complains of severe headache and shortness of breath. Patient reports he started having symptoms shortness of breath and headache last Monday. He reports he got tested for COVID-19 on Thursday at [facility] which was positive. Patient reports since onset of symptoms his symptoms have been progressively getting worse which includes dry cough, progressive shortness of breath and severe frontal headache associated with intermittent fever and chills. He presented to ED where his oxygen saturation was noted to be 89% on room air. He had a CTA done which showed patchy bilateral pneumonia. Patient denies any chest pain, abdominal pain, nausea, vomiting, diarrhea, urine consistency dysuria, heat or cold intolerance  Patient did not receive vaccination for COVID-19 as he reports \"I do not feel comfortable with vaccine\"    We were asked to admit for work up and evaluation of the above problems. Past Medical History:   Diagnosis Date    Hypothyroidism     Mood disorder ([address]         Past Surgical History:   Procedure Laterality Date    HX KNEE ARTHROSCOPY      HX SHOULDER ARTHROSCOPY      HX TONSILLECTOMY         Social History     Tobacco Use    Smoking status: Current Every Day Smoker    Smokeless tobacco: Never Used   Substance Use Topics    Alcohol use: No        Family History   Problem Relation Age of Onset    Heart Disease Unknown     Diabetes Unknown     Depression Unknown    Reviewed  No Known Allergies     Prior to Admission medications    Medication Sig Start Date End Date Taking? Authorizing Provider   multivitamin (ONE A DAY) tablet Take 1 Tab by mouth daily. Provider, Historical   DOCOSAHEXANOIC ACID/EPA (FISH OIL PO) Take  by mouth. Provider, Historical   levothyroxine (LEVOTHROID) 75 mcg tablet Take  by mouth Daily (before breakfast). Provider, Historical   sertraline (ZOLOFT) 50 mg tablet Take  by mouth daily. Provider, Historical   Cholecalciferol, Vitamin D3, (VITAMIN D3) 1,000 unit cap Take  by mouth. Provider, Historical       REVIEW OF SYSTEMS:     I am not able to complete the review of systems because:    The patient is intubated and sedated    The patient has altered mental status due to his acute medical problems    The patient has baseline aphasia from prior stroke(s)    The patient has baseline dementia and is not reliable historian    The patient is in acute medical distress and unable to provide information           Total of 12 systems reviewed as follows:       POSITIVE= underlined text  Negative = text not underlined  General:  fever, chills, sweats, generalized weakness, weight loss/gain,      loss of appetite   Eyes:    blurred vision, eye pain, loss of vision, double vision  ENT:    rhinorrhea, pharyngitis   Respiratory:   cough, sputum production, SOB, SANFORD, wheezing, pleuritic pain   Cardiology:   chest pain, palpitations, orthopnea, PND, edema, syncope   Gastrointestinal:  abdominal pain , N/V, diarrhea, dysphagia, constipation, bleeding   Genitourinary:  frequency, urgency, dysuria, hematuria, incontinence   Muskuloskeletal :  arthralgia, myalgia, back pain  Hematology:  easy bruising, nose or gum bleeding, lymphadenopathy   Dermatological: rash, ulceration, pruritis, color change / jaundice  Endocrine:   hot flashes or polydipsia   Neurological:  headache, dizziness, confusion, focal weakness, paresthesia,     Speech difficulties, memory loss, gait difficulty  Psychological: Feelings of anxiety, depression, agitation    Objective:   VITALS:    Visit Vitals  /89   Pulse (!) 121   Temp 98.7 °F (37.1 °C)   Resp 16   Ht 5' 7\" (1.702 m)   Wt 88.6 kg (195 lb 5.2 oz)   SpO2 (!) 89%   BMI 30.59 kg/m² PHYSICAL EXAM:    General:    Alert, cooperative, no distress, appears stated age. HEENT: Atraumatic, anicteric sclerae, pink conjunctivae     No oral ulcers, mucosa moist, throat clear, dentition fair  Neck:  Supple, symmetrical,  thyroid: non tender  Lungs:   Scattered crepitations in different lung fields y. No Wheezing or Rhonchi. No rales. Chest wall:  No tenderness  No Accessory muscle use. Heart:   Regular  rhythm,  No  murmur   No edema  Abdomen:   Soft, non-tender. Not distended. Bowel sounds normal  Extremities: No cyanosis. No clubbing,      Skin turgor normal, Capillary refill normal, Radial dial pulse 2+  Skin:     Not pale. Not Jaundiced  No rashes   Psych:  Good insight. Not depressed. Not anxious or agitated. Neurologic: EOMs intact. No facial asymmetry. No aphasia or slurred speech. Symmetrical strength, Sensation grossly intact. Alert and oriented X 4.     _______________________________________________________________________  Care Plan discussed with:    Comments   Patient x    Family      RN x    Care Manager                    Consultant:      _______________________________________________________________________  Expected  Disposition:   Home with Family x   HH/PT/OT/RN    SNF/LTC    RAIMUNDO    ________________________________________________________________________  TOTAL TIME:  39 Minutes    Critical Care Provided     Minutes non procedure based      Comments    x Reviewed previous records   >50% of visit spent in counseling and coordination of care x Discussion with patient and/or family and questions answered       ________________________________________________________________________  Signed: Trisha Flores MD    Procedures: see electronic medical records for all procedures/Xrays and details which were not copied into this note but were reviewed prior to creation of Plan.     LAB DATA REVIEWED:    Recent Results (from the past 24 hour(s))   CBC WITH AUTOMATED DIFF Collection Time: 06/22/21  8:13 AM   Result Value Ref Range    WBC 6.9 4.1 - 11.1 K/uL    RBC 5.10 4. 10 - 5.70 M/uL    HGB 15.4 12.1 - 17.0 g/dL    HCT 45.6 36.6 - 50.3 %    MCV 89.4 80.0 - 99.0 FL    MCH 30.2 26.0 - 34.0 PG    MCHC 33.8 30.0 - 36.5 g/dL    RDW 12.3 11.5 - 14.5 %    PLATELET 650 980 - 238 K/uL    MPV 10.6 8.9 - 12.9 FL    NRBC 0.0 0  WBC    ABSOLUTE NRBC 0.00 0.00 - 0.01 K/uL    NEUTROPHILS 56 32 - 75 %    LYMPHOCYTES 33 12 - 49 %    MONOCYTES 10 5 - 13 %    EOSINOPHILS 0 0 - 7 %    BASOPHILS 0 0 - 1 %    IMMATURE GRANULOCYTES 1 (H) 0.0 - 0.5 %    ABS. NEUTROPHILS 3.9 1.8 - 8.0 K/UL    ABS. LYMPHOCYTES 2.3 0.8 - 3.5 K/UL    ABS. MONOCYTES 0.7 0.0 - 1.0 K/UL    ABS. EOSINOPHILS 0.0 0.0 - 0.4 K/UL    ABS. BASOPHILS 0.0 0.0 - 0.1 K/UL    ABS. IMM. GRANS. 0.1 (H) 0.00 - 0.04 K/UL    DF AUTOMATED     METABOLIC PANEL, COMPREHENSIVE    Collection Time: 06/22/21  8:13 AM   Result Value Ref Range    Sodium 132 (L) 136 - 145 mmol/L    Potassium 3.9 3.5 - 5.1 mmol/L    Chloride 100 97 - 108 mmol/L    CO2 25 21 - 32 mmol/L    Anion gap 7 5 - 15 mmol/L    Glucose 184 (H) 65 - 100 mg/dL    BUN 11 6 - 20 MG/DL    Creatinine 0.95 0.70 - 1.30 MG/DL    BUN/Creatinine ratio 12 12 - 20      GFR est AA >60 >60 ml/min/1.73m2    GFR est non-AA >60 >60 ml/min/1.73m2    Calcium 8.1 (L) 8.5 - 10.1 MG/DL    Bilirubin, total 0.7 0.2 - 1.0 MG/DL    ALT (SGPT) 64 12 - 78 U/L    AST (SGOT) 49 (H) 15 - 37 U/L    Alk.  phosphatase 48 45 - 117 U/L    Protein, total 7.1 6.4 - 8.2 g/dL    Albumin 3.3 (L) 3.5 - 5.0 g/dL    Globulin 3.8 2.0 - 4.0 g/dL    A-G Ratio 0.9 (L) 1.1 - 2.2     CK W/ REFLX CKMB    Collection Time: 06/22/21  8:13 AM   Result Value Ref Range     39 - 308 U/L   TROPONIN I    Collection Time: 06/22/21  8:13 AM   Result Value Ref Range    Troponin-I, Qt. <0.05 <0.05 ng/mL   D DIMER    Collection Time: 06/22/21  8:30 AM   Result Value Ref Range    D-dimer 0.87 (H) 0.00 - 0.65 mg/L FEU   BLOOD GAS,CHEM8,LACTIC ACID POC    Collection Time: 06/22/21  8:36 AM   Result Value Ref Range    Calcium, ionized (POC) 1.14 1.12 - 1.32 mmol/L    BICARBONATE 24 mmol/L    Base deficit (POC) 1.2 mmol/L    Sample source VENOUS BLOOD      CO2, POC 24 19 - 24 MMOL/L    Sodium,  (L) 136 - 145 MMOL/L    Potassium, POC 3.9 3.5 - 5.5 MMOL/L    Chloride, POC 98 (L) 100 - 108 MMOL/L    Glucose,  (H) 74 - 106 MG/DL    Creatinine, POC 0.9 0.6 - 1.3 MG/DL    Lactic Acid (POC) 1.61 0.40 - 2.00 mmol/L    pH, venous (POC) 7.39 7.32 - 7.42      pCO2, venous (POC) 39.1 (L) 41 - 51 MMHG    pO2, venous (POC) 29 25 - 40 mmHg

## 2021-06-23 LAB
ALBUMIN SERPL-MCNC: 3.3 G/DL (ref 3.5–5)
ALBUMIN/GLOB SERPL: 0.8 {RATIO} (ref 1.1–2.2)
ALP SERPL-CCNC: 59 U/L (ref 45–117)
ALT SERPL-CCNC: 65 U/L (ref 12–78)
ANION GAP SERPL CALC-SCNC: 7 MMOL/L (ref 5–15)
AST SERPL-CCNC: 46 U/L (ref 15–37)
ATRIAL RATE: 114 BPM
BASOPHILS # BLD: 0 K/UL (ref 0–0.1)
BASOPHILS NFR BLD: 0 % (ref 0–1)
BILIRUB SERPL-MCNC: 0.5 MG/DL (ref 0.2–1)
BUN SERPL-MCNC: 14 MG/DL (ref 6–20)
BUN/CREAT SERPL: 18 (ref 12–20)
CALCIUM SERPL-MCNC: 8.8 MG/DL (ref 8.5–10.1)
CALCULATED P AXIS, ECG09: 58 DEGREES
CALCULATED R AXIS, ECG10: 47 DEGREES
CALCULATED T AXIS, ECG11: -55 DEGREES
CHLORIDE SERPL-SCNC: 109 MMOL/L (ref 97–108)
CO2 SERPL-SCNC: 24 MMOL/L (ref 21–32)
CREAT SERPL-MCNC: 0.77 MG/DL (ref 0.7–1.3)
CRP SERPL-MCNC: 2.38 MG/DL (ref 0–0.6)
D DIMER PPP FEU-MCNC: 0.89 MG/L FEU (ref 0–0.65)
DIAGNOSIS, 93000: NORMAL
DIFFERENTIAL METHOD BLD: ABNORMAL
EOSINOPHIL # BLD: 0 K/UL (ref 0–0.4)
EOSINOPHIL NFR BLD: 0 % (ref 0–7)
ERYTHROCYTE [DISTWIDTH] IN BLOOD BY AUTOMATED COUNT: 12.5 % (ref 11.5–14.5)
FERRITIN SERPL-MCNC: 1190 NG/ML (ref 26–388)
GLOBULIN SER CALC-MCNC: 4 G/DL (ref 2–4)
GLUCOSE BLD STRIP.AUTO-MCNC: 202 MG/DL (ref 65–117)
GLUCOSE BLD STRIP.AUTO-MCNC: 264 MG/DL (ref 65–117)
GLUCOSE BLD STRIP.AUTO-MCNC: 301 MG/DL (ref 65–117)
GLUCOSE BLD STRIP.AUTO-MCNC: 309 MG/DL (ref 65–117)
GLUCOSE SERPL-MCNC: 239 MG/DL (ref 65–100)
HCT VFR BLD AUTO: 47.8 % (ref 36.6–50.3)
HGB BLD-MCNC: 16.3 G/DL (ref 12.1–17)
IMM GRANULOCYTES # BLD AUTO: 0 K/UL (ref 0–0.04)
IMM GRANULOCYTES NFR BLD AUTO: 1 % (ref 0–0.5)
LDH SERPL L TO P-CCNC: 414 U/L (ref 85–241)
LYMPHOCYTES # BLD: 2.2 K/UL (ref 0.8–3.5)
LYMPHOCYTES NFR BLD: 24 % (ref 12–49)
MCH RBC QN AUTO: 30.6 PG (ref 26–34)
MCHC RBC AUTO-ENTMCNC: 34.1 G/DL (ref 30–36.5)
MCV RBC AUTO: 89.8 FL (ref 80–99)
MONOCYTES # BLD: 0.8 K/UL (ref 0–1)
MONOCYTES NFR BLD: 9 % (ref 5–13)
NEUTS SEG # BLD: 5.8 K/UL (ref 1.8–8)
NEUTS SEG NFR BLD: 66 % (ref 32–75)
NRBC # BLD: 0 K/UL (ref 0–0.01)
NRBC BLD-RTO: 0 PER 100 WBC
P-R INTERVAL, ECG05: 142 MS
PLATELET # BLD AUTO: 169 K/UL (ref 150–400)
PMV BLD AUTO: 10.7 FL (ref 8.9–12.9)
POTASSIUM SERPL-SCNC: 4.4 MMOL/L (ref 3.5–5.1)
PROT SERPL-MCNC: 7.3 G/DL (ref 6.4–8.2)
Q-T INTERVAL, ECG07: 318 MS
QRS DURATION, ECG06: 90 MS
QTC CALCULATION (BEZET), ECG08: 438 MS
RBC # BLD AUTO: 5.32 M/UL (ref 4.1–5.7)
SERVICE CMNT-IMP: ABNORMAL
SODIUM SERPL-SCNC: 140 MMOL/L (ref 136–145)
VENTRICULAR RATE, ECG03: 114 BPM
WBC # BLD AUTO: 8.8 K/UL (ref 4.1–11.1)

## 2021-06-23 PROCEDURE — 85379 FIBRIN DEGRADATION QUANT: CPT

## 2021-06-23 PROCEDURE — 80053 COMPREHEN METABOLIC PANEL: CPT

## 2021-06-23 PROCEDURE — 94760 N-INVAS EAR/PLS OXIMETRY 1: CPT

## 2021-06-23 PROCEDURE — 74011636637 HC RX REV CODE- 636/637: Performed by: INTERNAL MEDICINE

## 2021-06-23 PROCEDURE — 36415 COLL VENOUS BLD VENIPUNCTURE: CPT

## 2021-06-23 PROCEDURE — 85025 COMPLETE CBC W/AUTO DIFF WBC: CPT

## 2021-06-23 PROCEDURE — 82728 ASSAY OF FERRITIN: CPT

## 2021-06-23 PROCEDURE — 82962 GLUCOSE BLOOD TEST: CPT

## 2021-06-23 PROCEDURE — 83615 LACTATE (LD) (LDH) ENZYME: CPT

## 2021-06-23 PROCEDURE — 86140 C-REACTIVE PROTEIN: CPT

## 2021-06-23 PROCEDURE — 74011250636 HC RX REV CODE- 250/636: Performed by: INTERNAL MEDICINE

## 2021-06-23 PROCEDURE — 65270000029 HC RM PRIVATE

## 2021-06-23 PROCEDURE — 77010033678 HC OXYGEN DAILY

## 2021-06-23 PROCEDURE — 74011250637 HC RX REV CODE- 250/637: Performed by: INTERNAL MEDICINE

## 2021-06-23 RX ORDER — DEXAMETHASONE 4 MG/1
6 TABLET ORAL DAILY
Status: DISCONTINUED | OUTPATIENT
Start: 2021-06-24 | End: 2021-06-25

## 2021-06-23 RX ORDER — DEXAMETHASONE 4 MG/1
6 TABLET ORAL DAILY
Status: DISCONTINUED | OUTPATIENT
Start: 2021-06-24 | End: 2021-06-23 | Stop reason: SDUPTHER

## 2021-06-23 RX ORDER — ALBUTEROL SULFATE 90 UG/1
2 AEROSOL, METERED RESPIRATORY (INHALATION)
Status: DISCONTINUED | OUTPATIENT
Start: 2021-06-23 | End: 2021-06-28 | Stop reason: HOSPADM

## 2021-06-23 RX ADMIN — INSULIN LISPRO 5 UNITS: 100 INJECTION, SOLUTION INTRAVENOUS; SUBCUTANEOUS at 12:51

## 2021-06-23 RX ADMIN — Medication 10 ML: at 06:24

## 2021-06-23 RX ADMIN — ATORVASTATIN CALCIUM 40 MG: 40 TABLET, FILM COATED ORAL at 22:17

## 2021-06-23 RX ADMIN — LEVOTHYROXINE SODIUM 88 MCG: 0.09 TABLET ORAL at 06:24

## 2021-06-23 RX ADMIN — INSULIN LISPRO 4 UNITS: 100 INJECTION, SOLUTION INTRAVENOUS; SUBCUTANEOUS at 22:17

## 2021-06-23 RX ADMIN — OXYCODONE HYDROCHLORIDE AND ACETAMINOPHEN 250 MG: 500 TABLET ORAL at 18:53

## 2021-06-23 RX ADMIN — CHOLECALCIFEROL TAB 25 MCG (1000 UNIT) 1000 UNITS: 25 TAB at 08:42

## 2021-06-23 RX ADMIN — Medication 10 ML: at 22:00

## 2021-06-23 RX ADMIN — BUTALBITAL, ACETAMINOPHEN, AND CAFFEINE 1 TABLET: 50; 325; 40 TABLET ORAL at 09:23

## 2021-06-23 RX ADMIN — BUTALBITAL, ACETAMINOPHEN, AND CAFFEINE 1 TABLET: 50; 325; 40 TABLET ORAL at 18:54

## 2021-06-23 RX ADMIN — INSULIN LISPRO 7 UNITS: 100 INJECTION, SOLUTION INTRAVENOUS; SUBCUTANEOUS at 18:53

## 2021-06-23 RX ADMIN — ZINC SULFATE 220 MG (50 MG) CAPSULE 50 MG: CAPSULE at 08:42

## 2021-06-23 RX ADMIN — ENOXAPARIN SODIUM 40 MG: 40 INJECTION SUBCUTANEOUS at 12:52

## 2021-06-23 RX ADMIN — Medication 10 ML: at 18:54

## 2021-06-23 RX ADMIN — INSULIN LISPRO 3 UNITS: 100 INJECTION, SOLUTION INTRAVENOUS; SUBCUTANEOUS at 09:23

## 2021-06-23 RX ADMIN — OXYCODONE HYDROCHLORIDE AND ACETAMINOPHEN 250 MG: 500 TABLET ORAL at 08:44

## 2021-06-23 RX ADMIN — ASPIRIN 81 MG: 81 TABLET, COATED ORAL at 08:42

## 2021-06-23 RX ADMIN — DEXAMETHASONE 6 MG: 4 TABLET ORAL at 08:43

## 2021-06-23 NOTE — PROGRESS NOTES
Hospitalist Progress Note    NAME: Farrah Regalado   :  1965   MRN:  [id number]       Assessment / Plan:    COVID-19 pneumonia POA  Hypoxia POA  Headache POA     -Patient reports her symptoms started last Monday and was tested positive for COVID-19 infection on Thursday at [place]  -He presented with a symptoms of headache and shortness of breath  -CTA showed patchy infiltrates suggestive of COVID-19 pneumonia  -Oxygen saturation was 89% on room air  Saturating well on 2 L via nasal cannula  -Continue with dexamethasone, he is out of remdesivir window   c/w vitamin C and zinc  Procalcitonin low, no benefits for antibiotics  D-dimer 0.89  -Continue statin, and aspirin 81 mg daily  -Lovenox medium dose     Non-insulin-dependent diabetes with hyperglycemia  -Hold Metformin, start NPH scheduled with Decadron  Continue sliding scale insulin     hypothyroidism  -Continue levothyroxine        Chronic anxiety and depression  -Continue Zoloft        Code Status: Patient wishes to be full code  Surrogate Decision Maker:  Girlfriend Case Rueda is a surrogate POA     DVT Prophylaxis: Lovenox subcu  GI Prophylaxis: not indicated     Baseline: Independent    30.0 - 39.9 Obese / Body mass index is 30.59 kg/m². Estimated discharge date:   Barriers: Clinical improvement, still short of breath         Subjective:     Chief Complaint / Reason for Physician Visit  Follow-up Covid pneumonia. Reporting some cough but does not want to take cough suppressant  Requiring 2 L of oxygen via nasal cannula   discussed with RN events overnight.      Review of Systems:  Symptom Y/N Comments  Symptom Y/N Comments   Fever/Chills n   Chest Pain n    Poor Appetite    Edema     Cough y   Abdominal Pain n    Sputum n   Joint Pain     SOB/SANFORD y   Pruritis/Rash     Nausea/vomit n   Tolerating PT/OT     Diarrhea n   Tolerating Diet yy    Constipation    Other       Could NOT obtain due to:      Objective:     VITALS:   Last 24hrs VS reviewed since prior progress note. Most recent are:  Patient Vitals for the past 24 hrs:   Temp Pulse Resp BP SpO2   06/23/21 0836 97.9 °F (36.6 °C) 87 18 121/75 93 %   06/22/21 2252 98.3 °F (36.8 °C) 73 16 (!) 111/59 94 %   06/22/21 1625 97.6 °F (36.4 °C) 87 16 106/77 94 %   06/22/21 1222 98.6 °F (37 °C) 88 18 106/82 95 %   06/22/21 1145  94 21 118/78 94 %   06/22/21 1130  93 19 104/81 93 %   06/22/21 1113  94 20 112/78 93 %   06/22/21 1030  98 18  (!) 89 %   06/22/21 1000  98 16  93 %   06/22/21 0934  100 20  93 %   06/22/21 0900  (!) 109 27  95 %       Intake/Output Summary (Last 24 hours) at 6/23/2021 0855  Last data filed at 6/22/2021 2000  Gross per 24 hour   Intake 960 ml   Output    Net 960 ml        I had a face to face encounter and independently examined this patient on 6/23/2021, as outlined below:  PHYSICAL EXAM:  General: WD, WN. Alert, cooperative, no acute distress    EENT:  EOMI. Anicteric sclerae. MMM  Resp:  CTA bilaterally, no wheezing or rales. No accessory muscle use  CV:  Regular  rhythm,  No edema  GI:  Soft, Non distended, Non tender. +Bowel sounds  Neurologic:  Alert and oriented X 3, normal speech,   Psych:   Good insight. Not anxious nor agitated  Skin:  No rashes. No jaundice    Reviewed most current lab test results and cultures  YES  Reviewed most current radiology test results   YES  Review and summation of old records today    NO  Reviewed patient's current orders and MAR    YES  PMH/SH reviewed - no change compared to H&P  ________________________________________________________________________  Care Plan discussed with:    Comments   Patient x    Family      RN x    Care Manager     Consultant                        Multidiciplinary team rounds were held today with , nursing, pharmacist and clinical coordinator. Patient's plan of care was discussed; medications were reviewed and discharge planning was addressed. ________________________________________________________________________  Total NON critical care TIME:35 Minutes    Total CRITICAL CARE TIME Spent:   Minutes non procedure based      Comments   >50% of visit spent in counseling and coordination of care     ________________________________________________________________________  Kian Munoz MD     Procedures: see electronic medical records for all procedures/Xrays and details which were not copied into this note but were reviewed prior to creation of Plan. LABS:  I reviewed today's most current labs and imaging studies.   Pertinent labs include:  Recent Labs     06/23/21 0630 06/22/21  0813   WBC 8.8 6.9   HGB 16.3 15.4   HCT 47.8 45.6    151     Recent Labs     06/23/21  0630 06/22/21  0813    132*   K 4.4 3.9   * 100   CO2 24 25   * 184*   BUN 14 11   CREA 0.77 0.95   CA 8.8 8.1*   ALB 3.3* 3.3*   TBILI 0.5 0.7   ALT 65 64       Signed: Kian Munoz MD

## 2021-06-23 NOTE — PROGRESS NOTES
Bedside shift change report given to Mathew Fitch (oncoming nurse) by Sekou Ji (offgoing nurse). Report included the following information SBAR, Kardex, Procedure Summary, Intake/Output, MAR and Recent Results.

## 2021-06-24 ENCOUNTER — APPOINTMENT (OUTPATIENT)
Dept: GENERAL RADIOLOGY | Age: 56
DRG: 177 | End: 2021-06-24
Attending: INTERNAL MEDICINE
Payer: COMMERCIAL

## 2021-06-24 LAB
ALBUMIN SERPL-MCNC: 2.9 G/DL (ref 3.5–5)
ALBUMIN/GLOB SERPL: 0.8 {RATIO} (ref 1.1–2.2)
ALP SERPL-CCNC: 55 U/L (ref 45–117)
ALT SERPL-CCNC: 55 U/L (ref 12–78)
ANION GAP SERPL CALC-SCNC: 7 MMOL/L (ref 5–15)
AST SERPL-CCNC: 36 U/L (ref 15–37)
BASOPHILS # BLD: 0 K/UL (ref 0–0.1)
BASOPHILS NFR BLD: 0 % (ref 0–1)
BILIRUB SERPL-MCNC: 0.6 MG/DL (ref 0.2–1)
BUN SERPL-MCNC: 13 MG/DL (ref 6–20)
BUN/CREAT SERPL: 18 (ref 12–20)
CALCIUM SERPL-MCNC: 8.4 MG/DL (ref 8.5–10.1)
CHLORIDE SERPL-SCNC: 107 MMOL/L (ref 97–108)
CO2 SERPL-SCNC: 25 MMOL/L (ref 21–32)
CREAT SERPL-MCNC: 0.74 MG/DL (ref 0.7–1.3)
CRP SERPL-MCNC: 1.43 MG/DL (ref 0–0.6)
D DIMER PPP FEU-MCNC: 0.61 MG/L FEU (ref 0–0.65)
DIFFERENTIAL METHOD BLD: ABNORMAL
EOSINOPHIL # BLD: 0 K/UL (ref 0–0.4)
EOSINOPHIL NFR BLD: 0 % (ref 0–7)
ERYTHROCYTE [DISTWIDTH] IN BLOOD BY AUTOMATED COUNT: 12.4 % (ref 11.5–14.5)
GLOBULIN SER CALC-MCNC: 3.6 G/DL (ref 2–4)
GLUCOSE BLD STRIP.AUTO-MCNC: 176 MG/DL (ref 65–117)
GLUCOSE BLD STRIP.AUTO-MCNC: 283 MG/DL (ref 65–117)
GLUCOSE BLD STRIP.AUTO-MCNC: 305 MG/DL (ref 65–117)
GLUCOSE BLD STRIP.AUTO-MCNC: 339 MG/DL (ref 65–117)
GLUCOSE SERPL-MCNC: 229 MG/DL (ref 65–100)
HCT VFR BLD AUTO: 43.9 % (ref 36.6–50.3)
HGB BLD-MCNC: 15.2 G/DL (ref 12.1–17)
IMM GRANULOCYTES # BLD AUTO: 0.1 K/UL (ref 0–0.04)
IMM GRANULOCYTES NFR BLD AUTO: 1 % (ref 0–0.5)
L PNEUMO1 AG UR QL IA: NEGATIVE
LYMPHOCYTES # BLD: 2.1 K/UL (ref 0.8–3.5)
LYMPHOCYTES NFR BLD: 17 % (ref 12–49)
MCH RBC QN AUTO: 30.6 PG (ref 26–34)
MCHC RBC AUTO-ENTMCNC: 34.6 G/DL (ref 30–36.5)
MCV RBC AUTO: 88.3 FL (ref 80–99)
MONOCYTES # BLD: 0.9 K/UL (ref 0–1)
MONOCYTES NFR BLD: 7 % (ref 5–13)
NEUTS SEG # BLD: 9.3 K/UL (ref 1.8–8)
NEUTS SEG NFR BLD: 75 % (ref 32–75)
NRBC # BLD: 0 K/UL (ref 0–0.01)
NRBC BLD-RTO: 0 PER 100 WBC
PLATELET # BLD AUTO: 199 K/UL (ref 150–400)
PMV BLD AUTO: 10.8 FL (ref 8.9–12.9)
POTASSIUM SERPL-SCNC: 4.5 MMOL/L (ref 3.5–5.1)
PROT SERPL-MCNC: 6.5 G/DL (ref 6.4–8.2)
RBC # BLD AUTO: 4.97 M/UL (ref 4.1–5.7)
SERVICE CMNT-IMP: ABNORMAL
SODIUM SERPL-SCNC: 139 MMOL/L (ref 136–145)
SPECIMEN SOURCE: NORMAL
WBC # BLD AUTO: 12.5 K/UL (ref 4.1–11.1)

## 2021-06-24 PROCEDURE — 86140 C-REACTIVE PROTEIN: CPT

## 2021-06-24 PROCEDURE — 74011636637 HC RX REV CODE- 636/637: Performed by: INTERNAL MEDICINE

## 2021-06-24 PROCEDURE — 74011250637 HC RX REV CODE- 250/637: Performed by: NURSE PRACTITIONER

## 2021-06-24 PROCEDURE — 74011250637 HC RX REV CODE- 250/637: Performed by: INTERNAL MEDICINE

## 2021-06-24 PROCEDURE — 85025 COMPLETE CBC W/AUTO DIFF WBC: CPT

## 2021-06-24 PROCEDURE — 36415 COLL VENOUS BLD VENIPUNCTURE: CPT

## 2021-06-24 PROCEDURE — 82962 GLUCOSE BLOOD TEST: CPT

## 2021-06-24 PROCEDURE — 65270000029 HC RM PRIVATE

## 2021-06-24 PROCEDURE — 80053 COMPREHEN METABOLIC PANEL: CPT

## 2021-06-24 PROCEDURE — 74011250636 HC RX REV CODE- 250/636: Performed by: INTERNAL MEDICINE

## 2021-06-24 PROCEDURE — 77030027138 HC INCENT SPIROMETER -A

## 2021-06-24 PROCEDURE — 85379 FIBRIN DEGRADATION QUANT: CPT

## 2021-06-24 PROCEDURE — 77010033678 HC OXYGEN DAILY

## 2021-06-24 PROCEDURE — 94760 N-INVAS EAR/PLS OXIMETRY 1: CPT

## 2021-06-24 PROCEDURE — 71045 X-RAY EXAM CHEST 1 VIEW: CPT

## 2021-06-24 RX ADMIN — ALBUTEROL SULFATE 2 PUFF: 90 AEROSOL, METERED RESPIRATORY (INHALATION) at 05:22

## 2021-06-24 RX ADMIN — Medication 10 ML: at 17:37

## 2021-06-24 RX ADMIN — INSULIN LISPRO 4 UNITS: 100 INJECTION, SOLUTION INTRAVENOUS; SUBCUTANEOUS at 22:18

## 2021-06-24 RX ADMIN — OXYCODONE HYDROCHLORIDE AND ACETAMINOPHEN 250 MG: 500 TABLET ORAL at 09:28

## 2021-06-24 RX ADMIN — CHOLECALCIFEROL TAB 25 MCG (1000 UNIT) 1000 UNITS: 25 TAB at 09:28

## 2021-06-24 RX ADMIN — Medication 10 ML: at 06:00

## 2021-06-24 RX ADMIN — ATORVASTATIN CALCIUM 40 MG: 40 TABLET, FILM COATED ORAL at 22:18

## 2021-06-24 RX ADMIN — HUMAN INSULIN 10 UNITS: 100 INJECTION, SUSPENSION SUBCUTANEOUS at 09:29

## 2021-06-24 RX ADMIN — ENOXAPARIN SODIUM 40 MG: 40 INJECTION SUBCUTANEOUS at 12:49

## 2021-06-24 RX ADMIN — INSULIN LISPRO 2 UNITS: 100 INJECTION, SOLUTION INTRAVENOUS; SUBCUTANEOUS at 09:30

## 2021-06-24 RX ADMIN — DEXAMETHASONE 6 MG: 4 TABLET ORAL at 09:28

## 2021-06-24 RX ADMIN — Medication 10 ML: at 22:18

## 2021-06-24 RX ADMIN — INSULIN LISPRO 5 UNITS: 100 INJECTION, SOLUTION INTRAVENOUS; SUBCUTANEOUS at 12:49

## 2021-06-24 RX ADMIN — BUTALBITAL, ACETAMINOPHEN, AND CAFFEINE 1 TABLET: 50; 325; 40 TABLET ORAL at 10:46

## 2021-06-24 RX ADMIN — INSULIN LISPRO 7 UNITS: 100 INJECTION, SOLUTION INTRAVENOUS; SUBCUTANEOUS at 17:36

## 2021-06-24 RX ADMIN — LEVOTHYROXINE SODIUM 88 MCG: 0.09 TABLET ORAL at 06:19

## 2021-06-24 RX ADMIN — ASPIRIN 81 MG: 81 TABLET, COATED ORAL at 09:28

## 2021-06-24 RX ADMIN — OXYCODONE HYDROCHLORIDE AND ACETAMINOPHEN 250 MG: 500 TABLET ORAL at 17:36

## 2021-06-24 RX ADMIN — ZINC SULFATE 220 MG (50 MG) CAPSULE 50 MG: CAPSULE at 09:28

## 2021-06-24 NOTE — PROGRESS NOTES
Hospitalist Progress Note    NAME: Dale Ribeiro   :  1965   MRN:  [id number]       Assessment / Plan:    COVID-19 pneumonia POA  Hypoxia POA worsening  Headache POA  Leukocytosis, most likely steroid induced  -Patient reports her symptoms started last Monday and was tested positive for COVID-19 infection on Thursday at [place]  -He presented with a symptoms of headache and shortness of breath  -CTA showed patchy infiltrates suggestive of COVID-19 pneumonia  -Oxygen saturation was 89% on room air  Saturating well on 2 L via nasal cannula  -Continue with dexamethasone, he is out of remdesivir window   c/w vitamin C and zinc  Procalcitonin low, no benefits for antibiotics  D-dimer 0.89  -Continue statin, and aspirin 81 mg daily  -Lovenox medium dose   : Patient oxygen requirement increased overnight, currently requiring 5 L of oxygen via nasal cannula. He reported being short of breath on minimal exertion    Non-insulin-dependent diabetes with hyperglycemia  -Hold Metformin, start NPH scheduled with Decadron  Continue sliding scale insulin     hypothyroidism  -Continue levothyroxine        Chronic anxiety and depression  -Continue Zoloft        Code Status: Patient wishes to be full code  Surrogate Decision Maker:  Girlfriendonna Posadas is a surrogate POA     DVT Prophylaxis: Lovenox subcu  GI Prophylaxis: not indicated     Baseline: Independent    30.0 - 39.9 Obese / Body mass index is 30.59 kg/m². Estimated discharge date:   Barriers: Clinical improvement, still short of breath         Subjective:     Chief Complaint / Reason for Physician Visit  Follow-up Covid pneumonia. Patient is more short of breath   discussed with RN events overnight.      Review of Systems:  Symptom Y/N Comments  Symptom Y/N Comments   Fever/Chills n   Chest Pain n    Poor Appetite    Edema     Cough y   Abdominal Pain n    Sputum n   Joint Pain     SOB/SANFORD y   Pruritis/Rash     Nausea/vomit n   Tolerating PT/OT     Diarrhea n   Tolerating Diet yy    Constipation    Other       Could NOT obtain due to:      Objective:     VITALS:   Last 24hrs VS reviewed since prior progress note. Most recent are:  Patient Vitals for the past 24 hrs:   Temp Pulse Resp BP SpO2   06/24/21 0825 98.1 °F (36.7 °C) 88 18 95/60 92 %   06/23/21 2356 97.6 °F (36.4 °C) 89 18 108/69 92 %   06/23/21 1543 98 °F (36.7 °C) 84 16 113/72 92 %       Intake/Output Summary (Last 24 hours) at 6/24/2021 0849  Last data filed at 6/23/2021 2356  Gross per 24 hour   Intake 233 ml   Output    Net 233 ml        I had a face to face encounter and independently examined this patient on 6/24/2021, as outlined below:  PHYSICAL EXAM:  General: WD, WN. Alert, cooperative, no acute distress    EENT:  EOMI. Anicteric sclerae. MMM  Resp:  CTA bilaterally, no wheezing or rales. No accessory muscle use  CV:  Regular  rhythm,  No edema  GI:  Soft, Non distended, Non tender. +Bowel sounds  Neurologic:  Alert and oriented X 3, normal speech,   Psych:   Good insight. Not anxious nor agitated  Skin:  No rashes. No jaundice    Reviewed most current lab test results and cultures  YES  Reviewed most current radiology test results   YES  Review and summation of old records today    NO  Reviewed patient's current orders and MAR    YES  PMH/ reviewed - no change compared to H&P  ________________________________________________________________________  Care Plan discussed with:    Comments   Patient x    Family      RN x    Care Manager     Consultant                        Multidiciplinary team rounds were held today with , nursing, pharmacist and clinical coordinator. Patient's plan of care was discussed; medications were reviewed and discharge planning was addressed.      ________________________________________________________________________  Total NON critical care TIME:35 Minutes    Total CRITICAL CARE TIME Spent:   Minutes non procedure based      Comments >50% of visit spent in counseling and coordination of care     ________________________________________________________________________  eJssa Marks MD     Procedures: see electronic medical records for all procedures/Xrays and details which were not copied into this note but were reviewed prior to creation of Plan. LABS:  I reviewed today's most current labs and imaging studies.   Pertinent labs include:  Recent Labs     06/24/21  0316 06/23/21  0630 06/22/21  0813   WBC 12.5* 8.8 6.9   HGB 15.2 16.3 15.4   HCT 43.9 47.8 45.6    169 151     Recent Labs     06/24/21  0316 06/23/21  0630 06/22/21  0813    140 132*   K 4.5 4.4 3.9    109* 100   CO2 25 24 25   * 239* 184*   BUN 13 14 11   CREA 0.74 0.77 0.95   CA 8.4* 8.8 8.1*   ALB 2.9* 3.3* 3.3*   TBILI 0.6 0.5 0.7   ALT 55 65 64       Signed: Jessa Marks MD

## 2021-06-24 NOTE — PROGRESS NOTES
End of Shift Note    Bedside shift change report given to PCU float nurse (oncoming nurse) by Gladis Paige (offgoing nurse).   Report included the following information SBAR, Kardex, Intake/Output, MAR and Recent Results    Shift worked:  7-7p     Shift summary and any significant changes:     Pt on 2L O2. PRN Fioricet given for headache     Concerns for physician to address:  BG trending up      Zone phone for oncoming shift:   [phone]

## 2021-06-24 NOTE — PROGRESS NOTES
Transition of Care Plan:    RUR: 12%   Disposition: Home with family assistance  Follow up appointments: PCP  DME needed: TBD - Pt will need 02 challenge 24 hours prior to d/c  Transportation at Discharge: [address] or means to access home: Pt has keys       Caregiver Contact: Yasmin Tom ([phone])  Discharge Caregiver contacted prior to discharge? Pt will contact cg prior to d/c    Reason for Admission:  COVID-19                     RUR Score:     12%                Plan for utilizing home health:      No needs    PCP: First and Last name:  Noam Kraus NP     Name of Practice: [facility]   Are you a current patient: Yes/No: Yes   Approximate date of last visit: Unsure   Can you participate in a virtual visit with your PCP: Yes                    Current Advanced Directive/Advance Care Plan: Full Code. Pt has no ACP docs on file      Healthcare Decision Maker:  Pt has no ACP docs on file    Transition of Care Plan:         Home with family assistance    CM contacted patient via telephone at bedside to complete initial assessment. CM introduced role, verified demographics, and discussed discharge planning. Pt reports his physical address is [address] [place], [address] [place] [address]. Pt in independent at baseline and uses no DME. Pt reports having hx of SNF however does not remember name of facility. Pt will drive himself home at d/c. CM will continue to follow for discharge planning while patient is admitted on current unit. Please contact this CM with questions or issues related to discharge. Care Management Interventions  PCP Verified by CM:  Yes (NP Jenna Busch - unsure of last visit)  Mode of Transport at Discharge: Self  Transition of Care Consult (CM Consult): Discharge Planning  Discharge Durable Medical Equipment: No  Physical Therapy Consult: No  Occupational Therapy Consult: No  Speech Therapy Consult: No  Current Support Network: Lives Alone  Confirm Follow Up Transport: Self  Discharge Location  Discharge Placement: Home with family assistance  ------------------------------------------  Advance Care Planning     General Advance Care Planning (ACP) Conversation      Date of Conversation: 6/24/2021  Conducted with: Patient with Decision Making Capacity    Content/Action Overview:   DECLINED ACP conversation - will revisit periodically     ABISAI Alva  Care Manager [facility]  [phone]

## 2021-06-25 LAB
ALBUMIN SERPL-MCNC: 2.9 G/DL (ref 3.5–5)
ALBUMIN/GLOB SERPL: 0.7 {RATIO} (ref 1.1–2.2)
ALP SERPL-CCNC: 64 U/L (ref 45–117)
ALT SERPL-CCNC: 58 U/L (ref 12–78)
ANION GAP SERPL CALC-SCNC: 4 MMOL/L (ref 5–15)
AST SERPL-CCNC: 48 U/L (ref 15–37)
BASOPHILS # BLD: 0 K/UL (ref 0–0.1)
BASOPHILS NFR BLD: 0 % (ref 0–1)
BILIRUB SERPL-MCNC: 0.7 MG/DL (ref 0.2–1)
BUN SERPL-MCNC: 16 MG/DL (ref 6–20)
BUN/CREAT SERPL: 19 (ref 12–20)
CALCIUM SERPL-MCNC: 8.6 MG/DL (ref 8.5–10.1)
CHLORIDE SERPL-SCNC: 106 MMOL/L (ref 97–108)
CO2 SERPL-SCNC: 29 MMOL/L (ref 21–32)
CREAT SERPL-MCNC: 0.84 MG/DL (ref 0.7–1.3)
DIFFERENTIAL METHOD BLD: ABNORMAL
EOSINOPHIL # BLD: 0 K/UL (ref 0–0.4)
EOSINOPHIL NFR BLD: 0 % (ref 0–7)
ERYTHROCYTE [DISTWIDTH] IN BLOOD BY AUTOMATED COUNT: 12.6 % (ref 11.5–14.5)
GLOBULIN SER CALC-MCNC: 4 G/DL (ref 2–4)
GLUCOSE BLD STRIP.AUTO-MCNC: 207 MG/DL (ref 65–117)
GLUCOSE BLD STRIP.AUTO-MCNC: 290 MG/DL (ref 65–117)
GLUCOSE BLD STRIP.AUTO-MCNC: 300 MG/DL (ref 65–117)
GLUCOSE BLD STRIP.AUTO-MCNC: 304 MG/DL (ref 65–117)
GLUCOSE SERPL-MCNC: 212 MG/DL (ref 65–100)
HCT VFR BLD AUTO: 44.5 % (ref 36.6–50.3)
HGB BLD-MCNC: 14.8 G/DL (ref 12.1–17)
IMM GRANULOCYTES # BLD AUTO: 0.1 K/UL (ref 0–0.04)
IMM GRANULOCYTES NFR BLD AUTO: 1 % (ref 0–0.5)
LYMPHOCYTES # BLD: 2.2 K/UL (ref 0.8–3.5)
LYMPHOCYTES NFR BLD: 18 % (ref 12–49)
MCH RBC QN AUTO: 30.1 PG (ref 26–34)
MCHC RBC AUTO-ENTMCNC: 33.3 G/DL (ref 30–36.5)
MCV RBC AUTO: 90.6 FL (ref 80–99)
MONOCYTES # BLD: 0.6 K/UL (ref 0–1)
MONOCYTES NFR BLD: 5 % (ref 5–13)
NEUTS SEG # BLD: 9.1 K/UL (ref 1.8–8)
NEUTS SEG NFR BLD: 76 % (ref 32–75)
NRBC # BLD: 0 K/UL (ref 0–0.01)
NRBC BLD-RTO: 0 PER 100 WBC
PLATELET # BLD AUTO: 170 K/UL (ref 150–400)
PMV BLD AUTO: 11 FL (ref 8.9–12.9)
POTASSIUM SERPL-SCNC: 4.6 MMOL/L (ref 3.5–5.1)
PROT SERPL-MCNC: 6.9 G/DL (ref 6.4–8.2)
RBC # BLD AUTO: 4.91 M/UL (ref 4.1–5.7)
SERVICE CMNT-IMP: ABNORMAL
SODIUM SERPL-SCNC: 139 MMOL/L (ref 136–145)
WBC # BLD AUTO: 12.1 K/UL (ref 4.1–11.1)

## 2021-06-25 PROCEDURE — 85025 COMPLETE CBC W/AUTO DIFF WBC: CPT

## 2021-06-25 PROCEDURE — 74011000258 HC RX REV CODE- 258: Performed by: INTERNAL MEDICINE

## 2021-06-25 PROCEDURE — 74011636637 HC RX REV CODE- 636/637: Performed by: INTERNAL MEDICINE

## 2021-06-25 PROCEDURE — 82962 GLUCOSE BLOOD TEST: CPT

## 2021-06-25 PROCEDURE — 74011250637 HC RX REV CODE- 250/637: Performed by: INTERNAL MEDICINE

## 2021-06-25 PROCEDURE — 74011000250 HC RX REV CODE- 250: Performed by: INTERNAL MEDICINE

## 2021-06-25 PROCEDURE — 80053 COMPREHEN METABOLIC PANEL: CPT

## 2021-06-25 PROCEDURE — 74011250636 HC RX REV CODE- 250/636: Performed by: INTERNAL MEDICINE

## 2021-06-25 PROCEDURE — 94760 N-INVAS EAR/PLS OXIMETRY 1: CPT

## 2021-06-25 PROCEDURE — 77010033678 HC OXYGEN DAILY

## 2021-06-25 PROCEDURE — 36415 COLL VENOUS BLD VENIPUNCTURE: CPT

## 2021-06-25 PROCEDURE — 65270000029 HC RM PRIVATE

## 2021-06-25 RX ORDER — DEXAMETHASONE 4 MG/1
6 TABLET ORAL DAILY
Status: DISCONTINUED | OUTPATIENT
Start: 2021-06-26 | End: 2021-06-26

## 2021-06-25 RX ADMIN — ASPIRIN 81 MG: 81 TABLET, COATED ORAL at 08:47

## 2021-06-25 RX ADMIN — ZINC SULFATE 220 MG (50 MG) CAPSULE 50 MG: CAPSULE at 08:48

## 2021-06-25 RX ADMIN — INSULIN LISPRO 7 UNITS: 100 INJECTION, SOLUTION INTRAVENOUS; SUBCUTANEOUS at 12:50

## 2021-06-25 RX ADMIN — DEXAMETHASONE 6 MG: 4 TABLET ORAL at 08:47

## 2021-06-25 RX ADMIN — REMDESIVIR 200 MG: 100 INJECTION, POWDER, LYOPHILIZED, FOR SOLUTION INTRAVENOUS at 17:03

## 2021-06-25 RX ADMIN — Medication 10 ML: at 21:05

## 2021-06-25 RX ADMIN — ENOXAPARIN SODIUM 40 MG: 40 INJECTION SUBCUTANEOUS at 12:50

## 2021-06-25 RX ADMIN — Medication 10 ML: at 16:11

## 2021-06-25 RX ADMIN — AZITHROMYCIN MONOHYDRATE 500 MG: 500 INJECTION, POWDER, LYOPHILIZED, FOR SOLUTION INTRAVENOUS at 17:44

## 2021-06-25 RX ADMIN — INSULIN LISPRO 3 UNITS: 100 INJECTION, SOLUTION INTRAVENOUS; SUBCUTANEOUS at 08:50

## 2021-06-25 RX ADMIN — INSULIN LISPRO 4 UNITS: 100 INJECTION, SOLUTION INTRAVENOUS; SUBCUTANEOUS at 21:09

## 2021-06-25 RX ADMIN — Medication 10 ML: at 06:22

## 2021-06-25 RX ADMIN — OXYCODONE HYDROCHLORIDE AND ACETAMINOPHEN 250 MG: 500 TABLET ORAL at 17:42

## 2021-06-25 RX ADMIN — INSULIN LISPRO 5 UNITS: 100 INJECTION, SOLUTION INTRAVENOUS; SUBCUTANEOUS at 17:43

## 2021-06-25 RX ADMIN — CHOLECALCIFEROL TAB 25 MCG (1000 UNIT) 1000 UNITS: 25 TAB at 08:47

## 2021-06-25 RX ADMIN — ATORVASTATIN CALCIUM 40 MG: 40 TABLET, FILM COATED ORAL at 21:05

## 2021-06-25 RX ADMIN — HUMAN INSULIN 10 UNITS: 100 INJECTION, SUSPENSION SUBCUTANEOUS at 08:46

## 2021-06-25 RX ADMIN — LEVOTHYROXINE SODIUM 88 MCG: 0.09 TABLET ORAL at 06:22

## 2021-06-25 RX ADMIN — OXYCODONE HYDROCHLORIDE AND ACETAMINOPHEN 250 MG: 500 TABLET ORAL at 08:47

## 2021-06-25 RX ADMIN — CEFTRIAXONE 1 G: 1 INJECTION, POWDER, FOR SOLUTION INTRAMUSCULAR; INTRAVENOUS at 16:11

## 2021-06-25 NOTE — PROGRESS NOTES
End of Shift Note    Bedside shift change report given to Aryan Castro RN  (oncoming nurse) by Reji Sorto (offgoing nurse). Report included the following information SBAR, Kardex, ED Summary, Intake/Output, MAR and Recent Results    Shift worked:  5920-6448     Shift summary and any significant changes:     Patient on 6L O2 via NC. Patient saturating at 90%.       Concerns for physician to address:       Zone phone for oncoming shift:              Reji Sorto

## 2021-06-25 NOTE — PROGRESS NOTES
Hospitalist Progress Note    NAME: Maribeth Mayer   :  1965   MRN:  [id number]       Assessment / Plan:    COVID-19 pneumonia POA  Hypoxia POA worsening  Headache POA  Leukocytosis, most likely steroid induced  -Patient reports her symptoms started last Monday and was tested positive for COVID-19 infection on Thursday at [facility]  -He presented with a symptoms of headache and shortness of breath  -CTA showed patchy infiltrates suggestive of COVID-19 pneumonia  -Oxygen saturation was 89% on room air  Saturating well on 2 L via nasal cannula  -Continue with dexamethasone, he is out of remdesivir window   c/w vitamin C and zinc  Procalcitonin low, no benefits for antibiotics  D-dimer 0.89  -Continue statin, and aspirin 81 mg daily  -Lovenox medium dose   : Patient oxygen requirement increased overnight, currently requiring 5 L of oxygen via nasal cannula. He reported being short of breath on minimal exertion  : Patient still requiring oxygen at 5 to 6 L, will start remdesivir as he has been positive since  which is still within the 7 days  We will add antibiotics as chest x-ray showing worsening pneumonia and will also consult pulmonology    Non-insulin-dependent diabetes with hyperglycemia  -Hold Metformin, c/w NPH scheduled with Decadron  Continue sliding scale insulin     hypothyroidism  -Continue levothyroxine        Chronic anxiety and depression  -Continue Zoloft        Code Status: Patient wishes to be full code  Surrogate Decision Maker:  Girlfriendonna Jackson is a surrogate POA     DVT Prophylaxis: Lovenox subcu  GI Prophylaxis: not indicated     Baseline: Independent    30.0 - 39.9 Obese / Body mass index is 30.59 kg/m². Estimated discharge date:   Barriers: Clinical improvement, still short of breath         Subjective:     Chief Complaint / Reason for Physician Visit  Follow-up Covid pneumonia. No acute events discussed with RN events overnight.      Review of Systems:  Symptom Y/N Comments  Symptom Y/N Comments   Fever/Chills n   Chest Pain n    Poor Appetite    Edema     Cough y   Abdominal Pain n    Sputum n   Joint Pain     SOB/SANFORD y   Pruritis/Rash     Nausea/vomit n   Tolerating PT/OT     Diarrhea n   Tolerating Diet yy    Constipation    Other       Could NOT obtain due to:      Objective:     VITALS:   Last 24hrs VS reviewed since prior progress note. Most recent are:  Patient Vitals for the past 24 hrs:   Temp Pulse Resp BP SpO2   06/25/21 0855 98.1 °F (36.7 °C) 84 20 116/76 91 %   06/24/21 2220 97.7 °F (36.5 °C) 79 18 113/73 91 %   06/24/21 2007     92 %   06/24/21 1905 97.4 °F (36.3 °C) 85 20 (!) 98/48 (!) 89 %   06/24/21 1738     91 %   06/24/21 1622 97.6 °F (36.4 °C) 79 20 (!) 91/51 91 %   06/24/21 1255     (!) 88 %     No intake or output data in the 24 hours ending 06/25/21 0931     I had a face to face encounter and independently examined this patient on 6/25/2021, as outlined below:  PHYSICAL EXAM:  General: WD, WN. Alert, cooperative, no acute distress    EENT:  EOMI. Anicteric sclerae. MMM  Resp:  CTA bilaterally, no wheezing or rales. No accessory muscle use  CV:  Regular  rhythm,  No edema  GI:  Soft, Non distended, Non tender. +Bowel sounds  Neurologic:  Alert and oriented X 3, normal speech,   Psych:   Good insight. Not anxious nor agitated  Skin:  No rashes. No jaundice    Reviewed most current lab test results and cultures  YES  Reviewed most current radiology test results   YES  Review and summation of old records today    NO  Reviewed patient's current orders and MAR    YES  PMH/SH reviewed - no change compared to H&P  ________________________________________________________________________  Care Plan discussed with:    Comments   Patient x    Family      RN x    Care Manager     Consultant                        [facility] rounds were held today with , nursing, pharmacist and clinical coordinator. Patient's plan of care was discussed; medications were reviewed and discharge planning was addressed. ________________________________________________________________________  Total NON critical care TIME:35 Minutes    Total CRITICAL CARE TIME Spent:   Minutes non procedure based      Comments   >50% of visit spent in counseling and coordination of care     ________________________________________________________________________  Jen Beltran MD     Procedures: see electronic medical records for all procedures/Xrays and details which were not copied into this note but were reviewed prior to creation of Plan. LABS:  I reviewed today's most current labs and imaging studies.   Pertinent labs include:  Recent Labs     06/25/21  0441 06/24/21  0316 06/23/21  0630   WBC 12.1* 12.5* 8.8   HGB 14.8 15.2 16.3   HCT 44.5 43.9 47.8    199 169     Recent Labs     06/25/21  0441 06/24/21  0316 06/23/21  0630    139 140   K 4.6 4.5 4.4    107 109*   CO2 29 25 24   * 229* 239*   BUN 16 13 14   CREA 0.84 0.74 0.77   CA 8.6 8.4* 8.8   ALB 2.9* 2.9* 3.3*   TBILI 0.7 0.6 0.5   ALT 58 55 65       Signed: Jen Beltran MD

## 2021-06-25 NOTE — PROGRESS NOTES
End of Shift Note    Bedside shift change report given to Fahad Crawford (oncoming nurse) by Jean Boone (offgoing nurse). Report included the following information SBAR, Kardex, MAR, Accordion and Recent Results    Shift worked:  7-7p     Shift summary and any significant changes:     Pt on 5L O2 via NC. Incentive spirometer given to pt. Pt stepped on tiny piece of glass in bathroom, glass retrieved from foot and dressed. Concerns for physician to address:  BG control/steriod . BG still remains elevated.  Diabetes education  (he is a relatively new diabetic and is curious/questions)     Zone phone for oncoming shift:   [phone]

## 2021-06-26 LAB
ALBUMIN SERPL-MCNC: 2.8 G/DL (ref 3.5–5)
ALBUMIN/GLOB SERPL: 0.7 {RATIO} (ref 1.1–2.2)
ALP SERPL-CCNC: 55 U/L (ref 45–117)
ALT SERPL-CCNC: 75 U/L (ref 12–78)
ANION GAP SERPL CALC-SCNC: 7 MMOL/L (ref 5–15)
AST SERPL-CCNC: 36 U/L (ref 15–37)
BILIRUB SERPL-MCNC: 0.6 MG/DL (ref 0.2–1)
BUN SERPL-MCNC: 16 MG/DL (ref 6–20)
BUN/CREAT SERPL: 25 (ref 12–20)
CALCIUM SERPL-MCNC: 8.6 MG/DL (ref 8.5–10.1)
CHLORIDE SERPL-SCNC: 106 MMOL/L (ref 97–108)
CO2 SERPL-SCNC: 26 MMOL/L (ref 21–32)
CREAT SERPL-MCNC: 0.65 MG/DL (ref 0.7–1.3)
GLOBULIN SER CALC-MCNC: 3.8 G/DL (ref 2–4)
GLUCOSE BLD STRIP.AUTO-MCNC: 166 MG/DL (ref 65–117)
GLUCOSE BLD STRIP.AUTO-MCNC: 255 MG/DL (ref 65–117)
GLUCOSE BLD STRIP.AUTO-MCNC: 317 MG/DL (ref 65–117)
GLUCOSE BLD STRIP.AUTO-MCNC: 358 MG/DL (ref 65–117)
GLUCOSE SERPL-MCNC: 202 MG/DL (ref 65–100)
POTASSIUM SERPL-SCNC: 4.2 MMOL/L (ref 3.5–5.1)
PROT SERPL-MCNC: 6.6 G/DL (ref 6.4–8.2)
SERVICE CMNT-IMP: ABNORMAL
SODIUM SERPL-SCNC: 139 MMOL/L (ref 136–145)

## 2021-06-26 PROCEDURE — 74011636637 HC RX REV CODE- 636/637: Performed by: NURSE PRACTITIONER

## 2021-06-26 PROCEDURE — 80053 COMPREHEN METABOLIC PANEL: CPT

## 2021-06-26 PROCEDURE — 74011250637 HC RX REV CODE- 250/637: Performed by: INTERNAL MEDICINE

## 2021-06-26 PROCEDURE — 36415 COLL VENOUS BLD VENIPUNCTURE: CPT

## 2021-06-26 PROCEDURE — 65270000029 HC RM PRIVATE

## 2021-06-26 PROCEDURE — 74011000250 HC RX REV CODE- 250: Performed by: INTERNAL MEDICINE

## 2021-06-26 PROCEDURE — 74011636637 HC RX REV CODE- 636/637: Performed by: INTERNAL MEDICINE

## 2021-06-26 PROCEDURE — 74011250636 HC RX REV CODE- 250/636: Performed by: INTERNAL MEDICINE

## 2021-06-26 PROCEDURE — 94760 N-INVAS EAR/PLS OXIMETRY 1: CPT

## 2021-06-26 PROCEDURE — 82962 GLUCOSE BLOOD TEST: CPT

## 2021-06-26 PROCEDURE — 74011000258 HC RX REV CODE- 258: Performed by: INTERNAL MEDICINE

## 2021-06-26 PROCEDURE — 77010033678 HC OXYGEN DAILY

## 2021-06-26 RX ORDER — INSULIN LISPRO 100 [IU]/ML
5 INJECTION, SOLUTION INTRAVENOUS; SUBCUTANEOUS ONCE
Status: COMPLETED | OUTPATIENT
Start: 2021-06-26 | End: 2021-06-26

## 2021-06-26 RX ORDER — DEXAMETHASONE 4 MG/1
6 TABLET ORAL DAILY
Status: DISCONTINUED | OUTPATIENT
Start: 2021-06-27 | End: 2021-06-28 | Stop reason: HOSPADM

## 2021-06-26 RX ADMIN — INSULIN LISPRO 5 UNITS: 100 INJECTION, SOLUTION INTRAVENOUS; SUBCUTANEOUS at 21:11

## 2021-06-26 RX ADMIN — LEVOTHYROXINE SODIUM 88 MCG: 0.09 TABLET ORAL at 05:25

## 2021-06-26 RX ADMIN — ZINC SULFATE 220 MG (50 MG) CAPSULE 50 MG: CAPSULE at 08:25

## 2021-06-26 RX ADMIN — DEXAMETHASONE 6 MG: 4 TABLET ORAL at 08:26

## 2021-06-26 RX ADMIN — INSULIN LISPRO 2 UNITS: 100 INJECTION, SOLUTION INTRAVENOUS; SUBCUTANEOUS at 09:37

## 2021-06-26 RX ADMIN — Medication 10 ML: at 05:25

## 2021-06-26 RX ADMIN — Medication 10 ML: at 21:11

## 2021-06-26 RX ADMIN — REMDESIVIR 100 MG: 100 INJECTION, POWDER, LYOPHILIZED, FOR SOLUTION INTRAVENOUS at 16:27

## 2021-06-26 RX ADMIN — OXYCODONE HYDROCHLORIDE AND ACETAMINOPHEN 250 MG: 500 TABLET ORAL at 08:25

## 2021-06-26 RX ADMIN — INSULIN LISPRO 7 UNITS: 100 INJECTION, SOLUTION INTRAVENOUS; SUBCUTANEOUS at 17:38

## 2021-06-26 RX ADMIN — ENOXAPARIN SODIUM 40 MG: 40 INJECTION SUBCUTANEOUS at 12:10

## 2021-06-26 RX ADMIN — OXYCODONE HYDROCHLORIDE AND ACETAMINOPHEN 250 MG: 500 TABLET ORAL at 17:36

## 2021-06-26 RX ADMIN — ASPIRIN 81 MG: 81 TABLET, COATED ORAL at 08:25

## 2021-06-26 RX ADMIN — CHOLECALCIFEROL TAB 25 MCG (1000 UNIT) 1000 UNITS: 25 TAB at 08:25

## 2021-06-26 RX ADMIN — CEFTRIAXONE 1 G: 1 INJECTION, POWDER, FOR SOLUTION INTRAMUSCULAR; INTRAVENOUS at 15:28

## 2021-06-26 RX ADMIN — Medication 10 ML: at 13:33

## 2021-06-26 RX ADMIN — INSULIN LISPRO 5 UNITS: 100 INJECTION, SOLUTION INTRAVENOUS; SUBCUTANEOUS at 12:10

## 2021-06-26 RX ADMIN — HUMAN INSULIN 15 UNITS: 100 INJECTION, SUSPENSION SUBCUTANEOUS at 08:25

## 2021-06-26 RX ADMIN — BUTALBITAL, ACETAMINOPHEN, AND CAFFEINE 1 TABLET: 50; 325; 40 TABLET ORAL at 09:37

## 2021-06-26 RX ADMIN — ATORVASTATIN CALCIUM 40 MG: 40 TABLET, FILM COATED ORAL at 21:11

## 2021-06-26 RX ADMIN — AZITHROMYCIN MONOHYDRATE 500 MG: 500 INJECTION, POWDER, LYOPHILIZED, FOR SOLUTION INTRAVENOUS at 17:38

## 2021-06-26 NOTE — PROGRESS NOTES
Hospitalist Progress Note    NAME: Maribeth Mayer   :  1965   MRN:  [id number]       Assessment / Plan:    COVID-19 pneumonia POA  Hypoxia POA worsening  Headache POA  Leukocytosis, most likely steroid induced  -Patient reports her symptoms started last Monday and was tested positive for COVID-19 infection on Thursday at [facility]  -He presented with a symptoms of headache and shortness of breath  -CTA showed patchy infiltrates suggestive of COVID-19 pneumonia  -Oxygen saturation was 89% on room air  Saturating well on 2 L via nasal cannula  -Continue with dexamethasone, he is out of remdesivir window   c/w vitamin C and zinc  Procalcitonin low, no benefits for antibiotics  D-dimer 0.89  -Continue statin, and aspirin 81 mg daily  -Lovenox medium dose   : Patient oxygen requirement increased overnight, currently requiring 5 L of oxygen via nasal cannula. He reported being short of breath on minimal exertion  : Patient still requiring oxygen at 5 to 6 L, will start remdesivir as he has been positive since  which is still within the 7 days  We will add antibiotics as chest x-ray showing worsening pneumonia and will also consult pulmonology  : Currently on oxygen via nasal cannula at the 3 L but saturating at 89%      CT chest   Bilateral interstitial infiltrates are most compatible with a  typical viral pneumonia. More focal consolidation in the right upper lobe likely  accounts for the abnormality seen on chest radiograph. No evidence of pulmonary  embolism. Hepatic steatosis.  Possible subcentimeter hemangioma in the dome of  the liver    Non-insulin-dependent diabetes with hyperglycemia  -Hold Metformin, c/w NPH scheduled with Decadron  Continue sliding scale insulin     hypothyroidism  -Continue levothyroxine        Chronic anxiety and depression  -Continue Zoloft        Code Status: Patient wishes to be full code  Surrogate Decision Maker:  Girlfriend John Jackson is a surrogate POA     DVT Prophylaxis: Lovenox subcu  GI Prophylaxis: not indicated     Baseline: Independent    30.0 - 39.9 Obese / Body mass index is 30.59 kg/m². Estimated discharge date: June 25  Barriers: Clinical improvement, still short of breath         Subjective:     Chief Complaint / Reason for Physician Visit  Follow-up Covid pneumonia. Feels less short of breath. discussed with RN events overnight. Review of Systems:  Symptom Y/N Comments  Symptom Y/N Comments   Fever/Chills n   Chest Pain n    Poor Appetite    Edema     Cough y   Abdominal Pain n    Sputum n   Joint Pain     SOB/SANFORD y   Pruritis/Rash     Nausea/vomit n   Tolerating PT/OT     Diarrhea n   Tolerating Diet yy    Constipation    Other       Could NOT obtain due to:      Objective:     VITALS:   Last 24hrs VS reviewed since prior progress note. Most recent are:  Patient Vitals for the past 24 hrs:   Temp Pulse Resp BP SpO2   06/26/21 0817 98.2 °F (36.8 °C) 83 20 108/68 90 %   06/26/21 0008 98.7 °F (37.1 °C) 75 18 (!) 99/54 95 %   06/25/21 1718 98 °F (36.7 °C) 71 20 123/82 (!) 89 %     No intake or output data in the 24 hours ending 06/26/21 1100     I had a face to face encounter and independently examined this patient on 6/26/2021, as outlined below:  PHYSICAL EXAM:  General: WD, WN. Alert, cooperative, no acute distress    EENT:  EOMI. Anicteric sclerae. MMM  Resp:  CTA bilaterally, no wheezing or rales. No accessory muscle use  CV:  Regular  rhythm,  No edema  GI:  Soft, Non distended, Non tender. +Bowel sounds  Neurologic:  Alert and oriented X 3, normal speech,   Psych:   Good insight. Not anxious nor agitated  Skin:  No rashes.   No jaundice    Reviewed most current lab test results and cultures  YES  Reviewed most current radiology test results   YES  Review and summation of old records today    NO  Reviewed patient's current orders and MAR    YES  PMH/SH reviewed - no change compared to H&P  ________________________________________________________________________  Care Plan discussed with:    Comments   Patient x    Family      RN x    Care Manager     Consultant                        Multidiciplinary team rounds were held today with , nursing, pharmacist and clinical coordinator. Patient's plan of care was discussed; medications were reviewed and discharge planning was addressed. ________________________________________________________________________  Total NON critical care TIME:35 Minutes    Total CRITICAL CARE TIME Spent:   Minutes non procedure based      Comments   >50% of visit spent in counseling and coordination of care     ________________________________________________________________________  Jessa Marks MD     Procedures: see electronic medical records for all procedures/Xrays and details which were not copied into this note but were reviewed prior to creation of Plan. LABS:  I reviewed today's most current labs and imaging studies.   Pertinent labs include:  Recent Labs     06/25/21  0441 06/24/21 0316   WBC 12.1* 12.5*   HGB 14.8 15.2   HCT 44.5 43.9    199     Recent Labs     06/26/21  0527 06/25/21 0441 06/24/21 0316    139 139   K 4.2 4.6 4.5    106 107   CO2 26 29 25   * 212* 229*   BUN 16 16 13   CREA 0.65* 0.84 0.74   CA 8.6 8.6 8.4*   ALB 2.8* 2.9* 2.9*   TBILI 0.6 0.7 0.6   ALT 75 58 55       Signed: Jessa Marks MD

## 2021-06-26 NOTE — PROGRESS NOTES
End of Shift Note    Bedside shift change report given to Molly (oncoming nurse) by Radha Moreira RN (offgoing nurse).   Report included the following information Kardex, MAR and Recent Results    Shift worked:  7p-7a     Shift summary and any significant changes:     no significant changes     Concerns for physician to address:     Zone phone for oncoming shift:   [phone]

## 2021-06-26 NOTE — PROGRESS NOTES
End of Shift Note    Bedside shift change report given to Carlos Hinds (oncoming nurse) by Anthony Baptiste (offgoing nurse). Report included the following information SBAR, Kardex, Intake/Output, MAR, Accordion and Recent Results    Shift worked:  7-7p     Shift summary and any significant changes:     Pt weaned to 3L O2 via NC. Pulm consult called. Remdesivir given.  Switched to diabetic diet     Concerns for physician to address:  BG control      Zone phone for oncoming shift:   [phone]

## 2021-06-27 LAB
ALBUMIN SERPL-MCNC: 2.7 G/DL (ref 3.5–5)
ALBUMIN/GLOB SERPL: 0.8 {RATIO} (ref 1.1–2.2)
ALP SERPL-CCNC: 62 U/L (ref 45–117)
ALT SERPL-CCNC: 81 U/L (ref 12–78)
ANION GAP SERPL CALC-SCNC: 5 MMOL/L (ref 5–15)
AST SERPL-CCNC: 24 U/L (ref 15–37)
BILIRUB SERPL-MCNC: 0.5 MG/DL (ref 0.2–1)
BUN SERPL-MCNC: 16 MG/DL (ref 6–20)
BUN/CREAT SERPL: 26 (ref 12–20)
CALCIUM SERPL-MCNC: 8.6 MG/DL (ref 8.5–10.1)
CHLORIDE SERPL-SCNC: 107 MMOL/L (ref 97–108)
CO2 SERPL-SCNC: 26 MMOL/L (ref 21–32)
CREAT SERPL-MCNC: 0.62 MG/DL (ref 0.7–1.3)
CRP SERPL-MCNC: 0.74 MG/DL (ref 0–0.6)
GLOBULIN SER CALC-MCNC: 3.6 G/DL (ref 2–4)
GLUCOSE BLD STRIP.AUTO-MCNC: 177 MG/DL (ref 65–117)
GLUCOSE BLD STRIP.AUTO-MCNC: 278 MG/DL (ref 65–117)
GLUCOSE BLD STRIP.AUTO-MCNC: 312 MG/DL (ref 65–117)
GLUCOSE BLD STRIP.AUTO-MCNC: 323 MG/DL (ref 65–117)
GLUCOSE SERPL-MCNC: 221 MG/DL (ref 65–100)
POTASSIUM SERPL-SCNC: 4.2 MMOL/L (ref 3.5–5.1)
PROT SERPL-MCNC: 6.3 G/DL (ref 6.4–8.2)
SERVICE CMNT-IMP: ABNORMAL
SODIUM SERPL-SCNC: 138 MMOL/L (ref 136–145)

## 2021-06-27 PROCEDURE — 74011636637 HC RX REV CODE- 636/637: Performed by: INTERNAL MEDICINE

## 2021-06-27 PROCEDURE — 65270000029 HC RM PRIVATE

## 2021-06-27 PROCEDURE — 77010033678 HC OXYGEN DAILY

## 2021-06-27 PROCEDURE — 82962 GLUCOSE BLOOD TEST: CPT

## 2021-06-27 PROCEDURE — 36415 COLL VENOUS BLD VENIPUNCTURE: CPT

## 2021-06-27 PROCEDURE — 74011000250 HC RX REV CODE- 250: Performed by: INTERNAL MEDICINE

## 2021-06-27 PROCEDURE — 74011250636 HC RX REV CODE- 250/636: Performed by: INTERNAL MEDICINE

## 2021-06-27 PROCEDURE — 74011250637 HC RX REV CODE- 250/637: Performed by: INTERNAL MEDICINE

## 2021-06-27 PROCEDURE — 94760 N-INVAS EAR/PLS OXIMETRY 1: CPT

## 2021-06-27 PROCEDURE — 80053 COMPREHEN METABOLIC PANEL: CPT

## 2021-06-27 PROCEDURE — 86140 C-REACTIVE PROTEIN: CPT

## 2021-06-27 PROCEDURE — 74011000258 HC RX REV CODE- 258: Performed by: INTERNAL MEDICINE

## 2021-06-27 RX ADMIN — ENOXAPARIN SODIUM 40 MG: 40 INJECTION SUBCUTANEOUS at 13:08

## 2021-06-27 RX ADMIN — INSULIN LISPRO 5 UNITS: 100 INJECTION, SOLUTION INTRAVENOUS; SUBCUTANEOUS at 13:09

## 2021-06-27 RX ADMIN — Medication 10 ML: at 22:00

## 2021-06-27 RX ADMIN — OXYCODONE HYDROCHLORIDE AND ACETAMINOPHEN 250 MG: 500 TABLET ORAL at 09:25

## 2021-06-27 RX ADMIN — HUMAN INSULIN 10 UNITS: 100 INJECTION, SUSPENSION SUBCUTANEOUS at 17:15

## 2021-06-27 RX ADMIN — ZINC SULFATE 220 MG (50 MG) CAPSULE 50 MG: CAPSULE at 09:24

## 2021-06-27 RX ADMIN — REMDESIVIR 100 MG: 100 INJECTION, POWDER, LYOPHILIZED, FOR SOLUTION INTRAVENOUS at 17:12

## 2021-06-27 RX ADMIN — LEVOTHYROXINE SODIUM 88 MCG: 0.09 TABLET ORAL at 05:21

## 2021-06-27 RX ADMIN — CHOLECALCIFEROL TAB 25 MCG (1000 UNIT) 1000 UNITS: 25 TAB at 09:24

## 2021-06-27 RX ADMIN — AZITHROMYCIN MONOHYDRATE 500 MG: 500 INJECTION, POWDER, LYOPHILIZED, FOR SOLUTION INTRAVENOUS at 17:55

## 2021-06-27 RX ADMIN — Medication 10 ML: at 05:21

## 2021-06-27 RX ADMIN — DEXAMETHASONE 6 MG: 4 TABLET ORAL at 09:24

## 2021-06-27 RX ADMIN — ASPIRIN 81 MG: 81 TABLET, COATED ORAL at 09:24

## 2021-06-27 RX ADMIN — ATORVASTATIN CALCIUM 40 MG: 40 TABLET, FILM COATED ORAL at 21:54

## 2021-06-27 RX ADMIN — CEFTRIAXONE 1 G: 1 INJECTION, POWDER, FOR SOLUTION INTRAMUSCULAR; INTRAVENOUS at 16:32

## 2021-06-27 RX ADMIN — HUMAN INSULIN 20 UNITS: 100 INJECTION, SUSPENSION SUBCUTANEOUS at 09:22

## 2021-06-27 RX ADMIN — INSULIN LISPRO 7 UNITS: 100 INJECTION, SOLUTION INTRAVENOUS; SUBCUTANEOUS at 21:54

## 2021-06-27 RX ADMIN — OXYCODONE HYDROCHLORIDE AND ACETAMINOPHEN 250 MG: 500 TABLET ORAL at 17:58

## 2021-06-27 RX ADMIN — Medication 10 ML: at 13:10

## 2021-06-27 RX ADMIN — INSULIN LISPRO 2 UNITS: 100 INJECTION, SOLUTION INTRAVENOUS; SUBCUTANEOUS at 09:23

## 2021-06-27 RX ADMIN — INSULIN LISPRO 7 UNITS: 100 INJECTION, SOLUTION INTRAVENOUS; SUBCUTANEOUS at 16:30

## 2021-06-27 NOTE — CONSULTS
Pulmonary    Reason:  COVID 19 pneumonia - ? Actemra  Requesting:  Dr Epifanio Pandya reviewed / images viewed    63 yo male  has a past medical history of Hypothyroidism and Mood disorder ([facility] [address] Admitted 21 with covid 19 pneumonia and O2 sats of 89% on RA. Received remdesivir and on steroids. Imaging:    CTA  - patchy interstitial infiltrates greatest in RUL c/w viral pneumonia. No PE    CXR 21 - progression of asdz compared to     CRP:  3.28 --> 1.43    Oxygen requirement:  2 L/min --> 6 L/min --> 3 L/min    No data found. Temp (24hrs), Av.2 °F (36.8 °C), Min:98 °F (36.7 °C), Max:98.3 °F (36.8 °C)  No intake or output data in the 24 hours ending 21 0709    Pulmonary Impression / Recommendations:    COVID 19 pneumonia - inflammatory markers and O2 requirement seem to be improving.   Do not feel that addition of actemra would add benefit at this tiime but will repeat CRP and continue to monitor O2 requirement and clinical status    --repeat CRP  --group to follow on     Sona Nova MD

## 2021-06-27 NOTE — PROGRESS NOTES
Received notification from bedside RN about patient with regards to: , needs order for Lispro coverage    Intervention given: Lispro 5 units SQ x 1 dose ordered

## 2021-06-27 NOTE — PROGRESS NOTES
Bedside shift change report given to Zoe Jackson RN (oncoming nurse) by Mandi Irizarry RN (offgoing nurse). Report included the following information SBAR, Kardex, ED Summary, Procedure Summary, Intake/Output, MAR, Accordion and Recent Results.

## 2021-06-27 NOTE — PROGRESS NOTES
Problem: Falls - Risk of  Goal: *Absence of Falls  Description: Document Jacki Silveira Fall Risk and appropriate interventions in the flowsheet.   Outcome: Progressing Towards Goal  Note: Fall Risk Interventions:            Medication Interventions: Bed/chair exit alarm, Patient to call before getting OOB, Teach patient to arise slowly

## 2021-06-27 NOTE — PROGRESS NOTES
Hospitalist Progress Note    NAME: Dale Ribeiro   :  1965   MRN:  [id number]       Assessment / Plan:    COVID-19 pneumonia POA  Hypoxia POA worsening  Headache POA  Leukocytosis, most likely steroid induced  -Patient reports her symptoms started last Monday and was tested positive for COVID-19 infection on Thursday at [facility]  -He presented with a symptoms of headache and shortness of breath  -CTA showed patchy infiltrates suggestive of COVID-19 pneumonia  -Oxygen saturation was 89% on room air  Saturating well on 2 L via nasal cannula  -Continue with dexamethasone, he is out of remdesivir window   c/w vitamin C and zinc  Procalcitonin low, no benefits for antibiotics  D-dimer 0.89  -Continue statin, and aspirin 81 mg daily  -Lovenox medium dose   : Patient oxygen requirement increased overnight, currently requiring 5 L of oxygen via nasal cannula. He reported being short of breath on minimal exertion  : Patient still requiring oxygen at 5 to 6 L, will start remdesivir as he has been positive since  which is still within the 7 days  We will add antibiotics as chest x-ray showing worsening pneumonia and will also consult pulmonology  : Currently on oxygen via nasal cannula at the 3 L but saturating at 89%  : Clinically improving, blood sugar has been elevated, will increase the NPH to twice daily. Assess ambulatory pulse ox ,  plan for discharge tomorrow with home oxygen    CT chest   Bilateral interstitial infiltrates are most compatible with a  typical viral pneumonia. More focal consolidation in the right upper lobe likely  accounts for the abnormality seen on chest radiograph. No evidence of pulmonary  embolism. Hepatic steatosis.  Possible subcentimeter hemangioma in the dome of  the liver    Non-insulin-dependent diabetes with hyperglycemia  -Hold Metformin, c/w NPH scheduled with Decadron  Continue sliding scale insulin     hypothyroidism  -Continue levothyroxine        Chronic anxiety and depression  -Continue Zoloft        Code Status: Patient wishes to be full code  Surrogate Decision Maker:  Girlfriend Reji Traore is a surrogate POA     DVT Prophylaxis: Lovenox subcu  GI Prophylaxis: not indicated     Baseline: Independent    30.0 - 39.9 Obese / Body mass index is 33.47 kg/m². Estimated discharge date: June 28  Barriers: Clinical improvement, still short of breath         Subjective:     Chief Complaint / Reason for Physician Visit  Follow-up Covid pneumonia. Feels better, blood sugar elevated discussed with RN events overnight. Review of Systems:  Symptom Y/N Comments  Symptom Y/N Comments   Fever/Chills n   Chest Pain n    Poor Appetite    Edema     Cough y   Abdominal Pain n    Sputum n   Joint Pain     SOB/SANFORD y   Pruritis/Rash     Nausea/vomit n   Tolerating PT/OT     Diarrhea n   Tolerating Diet yy    Constipation    Other       Could NOT obtain due to:      Objective:     VITALS:   Last 24hrs VS reviewed since prior progress note. Most recent are:  Patient Vitals for the past 24 hrs:   Temp Pulse Resp BP SpO2   06/27/21 1211 97.9 °F (36.6 °C) 83 18 (!) 91/54 90 %   06/27/21 0805 97.8 °F (36.6 °C) 79 18 101/70 93 %   06/26/21 2241 98.3 °F (36.8 °C) 86 17 125/77 91 %   06/26/21 1533 98 °F (36.7 °C) 84 18 118/81 (!) 89 %     No intake or output data in the 24 hours ending 06/27/21 1523     I had a face to face encounter and independently examined this patient on 6/27/2021, as outlined below:  PHYSICAL EXAM:  General: WD, WN. Alert, cooperative, no acute distress    EENT:  EOMI. Anicteric sclerae. MMM  Resp:  CTA bilaterally, no wheezing or rales. No accessory muscle use  CV:  Regular  rhythm,  No edema  GI:  Soft, Non distended, Non tender. +Bowel sounds  Neurologic:  Alert and oriented X 3, normal speech,   Psych:   Good insight. Not anxious nor agitated  Skin:  No rashes.   No jaundice    Reviewed most current lab test results and cultures YES  Reviewed most current radiology test results   YES  Review and summation of old records today    NO  Reviewed patient's current orders and MAR    YES  PMH/SH reviewed - no change compared to H&P  ________________________________________________________________________  Care Plan discussed with:    Comments   Patient x    Family      RN x    Care Manager     Consultant                        Multidiciplinary team rounds were held today with , nursing, pharmacist and clinical coordinator. Patient's plan of care was discussed; medications were reviewed and discharge planning was addressed. ________________________________________________________________________  Total NON critical care TIME:35 Minutes    Total CRITICAL CARE TIME Spent:   Minutes non procedure based      Comments   >50% of visit spent in counseling and coordination of care     ________________________________________________________________________  Israel Gleason MD     Procedures: see electronic medical records for all procedures/Xrays and details which were not copied into this note but were reviewed prior to creation of Plan. LABS:  I reviewed today's most current labs and imaging studies.   Pertinent labs include:  Recent Labs     06/25/21  0441   WBC 12.1*   HGB 14.8   HCT 44.5        Recent Labs     06/27/21  0330 06/26/21  0527 06/25/21  0441    139 139   K 4.2 4.2 4.6    106 106   CO2 26 26 29   * 202* 212*   BUN 16 16 16   CREA 0.62* 0.65* 0.84   CA 8.6 8.6 8.6   ALB 2.7* 2.8* 2.9*   TBILI 0.5 0.6 0.7   ALT 81* 75 58       Signed: Israel Gleason MD

## 2021-06-27 NOTE — PROGRESS NOTES
End of Shift Note    Bedside shift change report given to Tolu (oncoming nurse) by Jaylan Smyth RN (offgoing nurse).   Report included the following information Kardex, MAR and Recent Results    Shift worked:  7p-7a     Shift summary and any significant changes:     no significant     Concerns for physician to address:       Zone phone for oncoming shift:   [phone]

## 2021-06-28 VITALS
DIASTOLIC BLOOD PRESSURE: 63 MMHG | HEART RATE: 79 BPM | OXYGEN SATURATION: 90 % | RESPIRATION RATE: 14 BRPM | TEMPERATURE: 98 F | BODY MASS INDEX: 33.54 KG/M2 | HEIGHT: 67 IN | SYSTOLIC BLOOD PRESSURE: 106 MMHG | WEIGHT: 213.7 LBS

## 2021-06-28 LAB
ALBUMIN SERPL-MCNC: 2.7 G/DL (ref 3.5–5)
ALBUMIN/GLOB SERPL: 0.7 {RATIO} (ref 1.1–2.2)
ALP SERPL-CCNC: 53 U/L (ref 45–117)
ALT SERPL-CCNC: 74 U/L (ref 12–78)
ANION GAP SERPL CALC-SCNC: 8 MMOL/L (ref 5–15)
AST SERPL-CCNC: 24 U/L (ref 15–37)
BACTERIA SPEC CULT: NORMAL
BACTERIA SPEC CULT: NORMAL
BILIRUB SERPL-MCNC: 0.6 MG/DL (ref 0.2–1)
BUN SERPL-MCNC: 17 MG/DL (ref 6–20)
BUN/CREAT SERPL: 25 (ref 12–20)
CALCIUM SERPL-MCNC: 8.1 MG/DL (ref 8.5–10.1)
CHLORIDE SERPL-SCNC: 106 MMOL/L (ref 97–108)
CO2 SERPL-SCNC: 26 MMOL/L (ref 21–32)
CREAT SERPL-MCNC: 0.69 MG/DL (ref 0.7–1.3)
CRP SERPL-MCNC: 0.42 MG/DL (ref 0–0.6)
GLOBULIN SER CALC-MCNC: 3.7 G/DL (ref 2–4)
GLUCOSE BLD STRIP.AUTO-MCNC: 130 MG/DL (ref 65–117)
GLUCOSE BLD STRIP.AUTO-MCNC: 225 MG/DL (ref 65–117)
GLUCOSE SERPL-MCNC: 166 MG/DL (ref 65–100)
POTASSIUM SERPL-SCNC: 4.1 MMOL/L (ref 3.5–5.1)
PROT SERPL-MCNC: 6.4 G/DL (ref 6.4–8.2)
SERVICE CMNT-IMP: ABNORMAL
SERVICE CMNT-IMP: ABNORMAL
SERVICE CMNT-IMP: NORMAL
SERVICE CMNT-IMP: NORMAL
SODIUM SERPL-SCNC: 140 MMOL/L (ref 136–145)

## 2021-06-28 PROCEDURE — 74011636637 HC RX REV CODE- 636/637: Performed by: INTERNAL MEDICINE

## 2021-06-28 PROCEDURE — 74011250637 HC RX REV CODE- 250/637: Performed by: INTERNAL MEDICINE

## 2021-06-28 PROCEDURE — 74011250636 HC RX REV CODE- 250/636: Performed by: INTERNAL MEDICINE

## 2021-06-28 PROCEDURE — 36415 COLL VENOUS BLD VENIPUNCTURE: CPT

## 2021-06-28 PROCEDURE — 82962 GLUCOSE BLOOD TEST: CPT

## 2021-06-28 PROCEDURE — 86140 C-REACTIVE PROTEIN: CPT

## 2021-06-28 PROCEDURE — 80053 COMPREHEN METABOLIC PANEL: CPT

## 2021-06-28 PROCEDURE — 94760 N-INVAS EAR/PLS OXIMETRY 1: CPT

## 2021-06-28 PROCEDURE — 77010033678 HC OXYGEN DAILY

## 2021-06-28 RX ORDER — DEXAMETHASONE 6 MG/1
6 TABLET ORAL
Qty: 4 TABLET | Refills: 0 | Status: SHIPPED | OUTPATIENT
Start: 2021-06-28 | End: 2021-06-28 | Stop reason: SDUPTHER

## 2021-06-28 RX ORDER — DEXAMETHASONE 6 MG/1
6 TABLET ORAL
Qty: 4 TABLET | Refills: 0 | Status: SHIPPED | OUTPATIENT
Start: 2021-06-28 | End: 2021-07-02

## 2021-06-28 RX ORDER — ZINC SULFATE 50(220)MG
50 CAPSULE ORAL DAILY
Qty: 4 CAPSULE | Refills: 0 | Status: SHIPPED | OUTPATIENT
Start: 2021-06-29 | End: 2021-07-03

## 2021-06-28 RX ORDER — METFORMIN HYDROCHLORIDE 500 MG/1
500 TABLET ORAL 2 TIMES DAILY WITH MEALS
Qty: 60 TABLET | Refills: 1 | Status: SHIPPED | OUTPATIENT
Start: 2021-06-28 | End: 2021-08-27

## 2021-06-28 RX ADMIN — ENOXAPARIN SODIUM 40 MG: 40 INJECTION SUBCUTANEOUS at 13:13

## 2021-06-28 RX ADMIN — LEVOTHYROXINE SODIUM 88 MCG: 0.09 TABLET ORAL at 05:10

## 2021-06-28 RX ADMIN — INSULIN LISPRO 3 UNITS: 100 INJECTION, SOLUTION INTRAVENOUS; SUBCUTANEOUS at 13:13

## 2021-06-28 RX ADMIN — ASPIRIN 81 MG: 81 TABLET, COATED ORAL at 08:56

## 2021-06-28 RX ADMIN — Medication 10 ML: at 13:13

## 2021-06-28 RX ADMIN — HUMAN INSULIN 20 UNITS: 100 INJECTION, SUSPENSION SUBCUTANEOUS at 08:56

## 2021-06-28 RX ADMIN — OXYCODONE HYDROCHLORIDE AND ACETAMINOPHEN 250 MG: 500 TABLET ORAL at 08:56

## 2021-06-28 RX ADMIN — ZINC SULFATE 220 MG (50 MG) CAPSULE 50 MG: CAPSULE at 08:56

## 2021-06-28 RX ADMIN — CHOLECALCIFEROL TAB 25 MCG (1000 UNIT) 1000 UNITS: 25 TAB at 08:56

## 2021-06-28 RX ADMIN — DEXAMETHASONE 6 MG: 4 TABLET ORAL at 08:56

## 2021-06-28 RX ADMIN — Medication 10 ML: at 06:00

## 2021-06-28 NOTE — PROGRESS NOTES
End of Shift Note    Bedside shift change report given to Bonifacio martinez RN (oncoming nurse) by Juana Saul RN (offgoing nurse). Report included the following information SBAR, Kardex, Intake/Output, MAR, Recent Results and Med Rec Status    Shift worked:  8440-3668     Shift summary and any significant changes:     maintaining 92-96% O2 on 3L. Ambulating independently. Denies pain. RT to eval for home O2 today for potential d/c. Concerns for physician to address:       Zone phone for oncoming shift:   [phone]       Activity:  Activity Level: Up ad philippe  Number times ambulated in hallways past shift: 0  Number of times OOB to chair past shift: 0    Cardiac:   Cardiac Monitoring: No      Cardiac Rhythm: Sinus Tach    Access:   Current line(s): PIV     Genitourinary:   Urinary status: voiding    Respiratory:   O2 Device: Nasal cannula  Chronic home O2 use?: NO  Incentive spirometer at bedside: NO     GI:  Last Bowel Movement Date: 06/26/21  Current diet:  ADULT DIET Regular; 4 carb choices (60 gm/meal)  Passing flatus: YES  Tolerating current diet: YES       Pain Management:   Patient states pain is manageable on current regimen: YES    Skin:  Keven Score: 21  Interventions: increase time out of bed and nutritional support     Patient Safety:  Fall Score:  Total Score: 1  Interventions: gripper socks       Length of Stay:  Expected LOS: 5d 9h  Actual LOS: 6      Juana Saul RN

## 2021-06-28 NOTE — PROGRESS NOTES
Pt being discharged home via personal transportation. IVs removed. Pt does not require home O2. Discharge instructions and follow up appts given and explained to pt.  Pt has been instructed to self-isolate for 10 days once he leaves the hospital.

## 2021-06-28 NOTE — PROGRESS NOTES
Transition of Care Plan:     RUR: 12%   Disposition: Home with family assistance  Follow up appointments: PCP  DME needed: No needs identified  Transportation at Discharge: [address] or means to access home: Pt has keys       Caregiver Contact: Kostas Page ([phone])  Discharge Caregiver contacted prior to discharge? Pt contacted his cg       CM acknowledged d/c orders. Pt will d/c home with family assistance. Pt's f/u information is on his AVS. Pt drove himself to the hospital and has his keys. No further needs identified at this time. Patient is ready to d/c on a CM standpoint. RN aware. Care Management Interventions  PCP Verified by CM:  Yes  Mode of Transport at Discharge: Self  Transition of Care Consult (CM Consult): Discharge Planning  Discharge Durable Medical Equipment: No  Physical Therapy Consult: No  Occupational Therapy Consult: No  Speech Therapy Consult: No  Current Support Network: Lives Alone  Confirm Follow Up Transport: Self  Discharge Location  Discharge Placement: Home with family assistance    ABISAI Martinez  Care Manager [facility]  [phone]

## 2021-06-28 NOTE — DISCHARGE INSTRUCTIONS
DISCHARGE DIAGNOSIS:  COVID-19 pneumonia POA  Hypoxia POA worsening  Headache POA  Leukocytosis, most likely steroid induced  -Non-insulin-dependent diabetes with hyperglycemia  hypothyroidism  Chronic anxiety and depression         MEDICATIONS:  · It is important that you take the medication exactly as they are prescribed. · Keep your medication in the bottles provided by the pharmacist and keep a list of the medication names, dosages, and times to be taken in your wallet. · Do not take other medications without consulting your doctor. Pain Management: per above medications    What to do at Home    Recommended diet:  Diabetic Diet    Recommended activity: Activity as tolerated    If you have questions regarding the hospital related prescriptions or hospital related issues please call [address] [place] [address] at . You can always direct your questions to your primary care doctor if you are unable to reach your hospital physician; your PCP works as an extension of your hospital doctor just like your hospital doctor is an extension of your PCP for your time at the hospital [facility], [facility]). If you experience any of the following symptoms then please call your primary care physician or return to the emergency room if you cannot get hold of your doctor:  Fever, chills, nausea, vomiting, diarrhea, change in mentation, falling, bleeding, shortness of breath  Advance Care Planning  People with COVID-19 may have no symptoms, mild symptoms, such as fever, cough, and shortness of breath or they may have more severe illness, developing severe and fatal pneumonia. As a result, Advance Care Planning with attention to naming a health care decision maker (someone you trust to make healthcare decisions for you if you could not speak for yourself) and sharing other health care preferences is important BEFORE a possible health crisis. Please contact your Primary Care Provider to discuss Advance Care Planning. Preventing the Spread of Coronavirus Disease 2019 in Homes and Residential Communities  For the most recent information go to ubigrateaners.fi    Prevention steps for People with confirmed or suspected COVID-19 (including persons under investigation) who do not need to be hospitalized  and   People with confirmed COVID-19 who were hospitalized and determined to be medically stable to go home    Your healthcare provider and public health staff will evaluate whether you can be cared for at home. If it is determined that you do not need to be hospitalized and can be isolated at home, you will be monitored by staff from your local or state health department. You should follow the prevention steps below until a healthcare provider or local or state health department says you can return to your normal activities. Stay home except to get medical care  People who are mildly ill with COVID-19 are able to isolate at home during their illness. You should restrict activities outside your home, except for getting medical care. Do not go to work, school, or public areas. Avoid using public transportation, ride-sharing, or taxis. Separate yourself from other people and animals in your home  People: As much as possible, you should stay in a specific room and away from other people in your home. Also, you should use a separate bathroom, if available. Animals: You should restrict contact with pets and other animals while you are sick with COVID-19, just like you would around other people. Although there have not been reports of pets or other animals becoming sick with COVID-19, it is still recommended that people sick with COVID-19 limit contact with animals until more information is known about the virus. When possible, have another member of your household care for your animals while you are sick.  If you are sick with COVID-19, avoid contact with your pet, including petting, snuggling, being kissed or licked, and sharing food. If you must care for your pet or be around animals while you are sick, wash your hands before and after you interact with pets and wear a facemask. Call ahead before visiting your doctor  If you have a medical appointment, call the healthcare provider and tell them that you have or may have COVID-19. This will help the healthcare providers office take steps to keep other people from getting infected or exposed. Wear a facemask  You should wear a facemask when you are around other people (e.g., sharing a room or vehicle) or pets and before you enter a healthcare providers office. If you are not able to wear a facemask (for example, because it causes trouble breathing), then people who live with you should not stay in the same room with you, or they should wear a facemask if they enter your room. Cover your coughs and sneezes  Cover your mouth and nose with a tissue when you cough or sneeze. Throw used tissues in a lined trash can. Immediately wash your hands with soap and water for at least 20 seconds or, if soap and water are not available, clean your hands with an alcohol-based hand  that contains at least 60% alcohol. Clean your hands often  Wash your hands often with soap and water for at least 20 seconds, especially after blowing your nose, coughing, or sneezing; going to the bathroom; and before eating or preparing food. If soap and water are not readily available, use an alcohol-based hand  with at least 60% alcohol, covering all surfaces of your hands and rubbing them together until they feel dry. Soap and water are the best option if hands are visibly dirty. Avoid touching your eyes, nose, and mouth with unwashed hands. Avoid sharing personal household items  You should not share dishes, drinking glasses, cups, eating utensils, towels, or bedding with other people or pets in your home.  After using these items, they should be washed thoroughly with soap and water. Clean all high-touch surfaces everyday  High touch surfaces include counters, tabletops, doorknobs, bathroom fixtures, toilets, phones, keyboards, tablets, and bedside tables. Also, clean any surfaces that may have blood, stool, or body fluids on them. Use a household cleaning spray or wipe, according to the label instructions. Labels contain instructions for safe and effective use of the cleaning product including precautions you should take when applying the product, such as wearing gloves and making sure you have good ventilation during use of the product. Monitor your symptoms  Seek prompt medical attention if your illness is worsening (e.g., difficulty breathing). Before seeking care, call your healthcare provider and tell them that you have, or are being evaluated for, COVID-19. Put on a facemask before you enter the facility. These steps will help the healthcare providers office to keep other people in the office or waiting room from getting infected or exposed. Ask your healthcare provider to call the local or [facility] health department. Persons who are placed under active monitoring or facilitated self-monitoring should follow instructions provided by their local health department or occupational health professionals, as appropriate. When working with your local health department check their available hours. If you have a medical emergency and need to call 911, notify the dispatch personnel that you have, or are being evaluated for COVID-19. If possible, put on a facemask before emergency medical services arrive. Discontinuing home isolation  Patients with confirmed COVID-19 should remain under home isolation precautions until the risk of secondary transmission to others is thought to be low.  The decision to discontinue home isolation precautions should be made on a case-by-case basis, in consultation with healthcare providers and state and [facility] health departments.

## 2021-06-28 NOTE — CONSULTS
Pulmonary    Reason:  COVID 19 pneumonia - ? Actemra  Requesting:  Dr Sheldon Wall reviewed / images viewed    21: Pt is feeling pretty good. NO chest pain, no back pain, no headaches. NO neck pain. Has a mild dry cough. Tolerating po intake well. NO GERD. Slept ok last pm. Reports that his girlfriend Xavier Rader is currently in ER being evaluated. 63 yo male  has a past medical history of Hypothyroidism and Mood disorder ([address] Admitted 21 with covid 19 pneumonia and O2 sats of 89% on RA. Received remdesivir and on steroids. Imaging:    CTA  - patchy interstitial infiltrates greatest in RUL c/w viral pneumonia. No PE    CXR 21 - progression of asdz compared to     CRP:  3.28 --> 1.43    Oxygen requirement:  2 L/min --> 6 L/min --> 3 L/min    Patient Vitals for the past 4 hrs:   BP Temp Pulse Resp SpO2   21 0838 106/63 98 °F (36.7 °C) 79 14 90 %   Temp (24hrs), Av.9 °F (36.6 °C), Min:97.7 °F (36.5 °C), Max:98 °F (36.7 °C)  No intake or output data in the 24 hours ending 21 1054  Exam:   Pt was lying in bed, no distress. Conversant. On NC oxygen at 3L with pox of 90%. Neck: supple  Lungs: CTA bilaterally. CV: nl s1,2. No MRG  Abd; +Bs, Soft, nt, nd.   Extrem: no C, C, E.   Neuro: nonfocal.             Pulmonary Impression / Recommendations:    COVID 19 pneumonia - inflammatory markers and O2 requirement seem to be improving. Do not feel that addition of actemra would add benefit at this time but will repeat CRP and continue to monitor O2 requirement and clinical status. ON remdesivir, on decadron. Pt could be discharged home at this point  Will assess home oxygen needs. CRP is too low for Actemra. Ex Smoker  Remote Etoh.      Sarah Greenfield MD

## 2021-06-28 NOTE — DISCHARGE SUMMARY
Hospitalist Discharge Summary     Patient ID:  Clint Madera  [id number]  21 y.o.  1965  6/22/2021    PCP on record: Kristin Dunham NP    Admit date: 6/22/2021  Discharge date and time: 6/28/2021    DISCHARGE DIAGNOSIS:  COVID-19 pneumonia POA  Hypoxia POA worsening  Headache POA  Leukocytosis, most likely steroid induced  -Non-insulin-dependent diabetes with hyperglycemia  hypothyroidism  Chronic anxiety and depression      CONSULTATIONS:  IP CONSULT TO HOSPITALIST  IP CONSULT TO PULMONOLOGY    Excerpted HPI from H&P of Francisco Muñoz MD:  CHIEF COMPLAINT: Shortness of breath and headache     HISTORY OF PRESENT ILLNESS:     Angely Martinez is a 64 y.o.  male with past medical history significant for non-insulin-dependent diabetes, dyslipidemia, hypothyroidism and chronic anxiety who presents to the ED with a complains of severe headache and shortness of breath. Patient reports he started having symptoms shortness of breath and headache last Monday. He reports he got tested for COVID-19 on Thursday at [facility] which was positive. Patient reports since onset of symptoms his symptoms have been progressively getting worse which includes dry cough, progressive shortness of breath and severe frontal headache associated with intermittent fever and chills. He presented to ED where his oxygen saturation was noted to be 89% on room air. He had a CTA done which showed patchy bilateral pneumonia.   Patient denies any chest pain, abdominal pain, nausea, vomiting, diarrhea, urine consistency dysuria, heat or cold intolerance  Patient did not receive vaccination for COVID-19 as he reports \"I do not feel comfortable with vaccine\"     We were asked to admit for work up and evaluation of the above problems.        ______________________________________________________________________  DISCHARGE SUMMARY/HOSPITAL COURSE:  for full details see H&P, daily progress notes, labs, consult notes.   COVID-19 pneumonia POA  Hypoxia POA worsening  Headache POA  Leukocytosis, most likely steroid induced  -Patient reports her symptoms started last Monday and was tested positive for COVID-19 infection on Thursday at [facility]  -He presented with a symptoms of headache and shortness of breath  -CTA showed patchy infiltrates suggestive of COVID-19 pneumonia  Patient was treated with Decadron and remdesivir, got 4 days of remdesivir and 6 days of Decadron  Is clinically improved, no shortness of breath at rest  He was weaned off oxygen  and ambulatory pulse ox showed that he will not require home oxygen  -  CT chest   Bilateral interstitial infiltrates are most compatible with a  typical viral pneumonia. More focal consolidation in the right upper lobe likely  accounts for the abnormality seen on chest radiograph. No evidence of pulmonary  embolism. Hepatic steatosis. Possible subcentimeter hemangioma in the dome of  the liver     Non-insulin-dependent diabetes with hyperglycemia  -Resume Metformin, dose increased. Patient was on NPH while on steroid in the hospital  Patient was instructed to check his blood sugars while on steroids and call PCP if blood sugars very elevated elevated  Continue sliding scale insulin      hypothyroidism  -Continue levothyroxine        Chronic anxiety and depression  -Continue Zoloft             _______________________________________________________________________  Patient seen and examined by me on discharge day. Pertinent Findings:  Gen:    Not in distress  Chest: Clear lungs  CVS:   Regular rhythm. No edema  Abd:  Soft, not distended, not tender  Neuro:  Alert,   _______________________________________________________________________  DISCHARGE MEDICATIONS:   Current Discharge Medication List      START taking these medications    Details   zinc sulfate (ZINCATE) 50 mg zinc (220 mg) capsule Take 1 Capsule by mouth daily for 4 days.   Qty: 4 Capsule, Refills: 0  Start date: 6/29/2021, End date: 7/3/2021      dexAMETHasone (Decadron) 6 mg tablet Take 1 Tablet by mouth Daily (before breakfast) for 4 days. Check your blood sugars twice a day while taking this medication   If blood sugars above 200-250mg call PCP  Qty: 4 Tablet, Refills: 0  Start date: 6/28/2021, End date: 7/2/2021         CONTINUE these medications which have CHANGED    Details   metFORMIN (GLUCOPHAGE) 500 mg tablet Take 1 Tablet by mouth two (2) times daily (with meals) for 60 days. Qty: 60 Tablet, Refills: 1  Start date: 6/28/2021, End date: 8/27/2021         CONTINUE these medications which have NOT CHANGED    Details   levothyroxine (SYNTHROID) 88 mcg tablet Take 88 mcg by mouth daily. aspirin delayed-release 81 mg tablet Take 81 mg by mouth daily. atorvastatin (LIPITOR) 20 mg tablet Take 20 mg by mouth daily. ascorbic acid, vitamin C, (Vitamin C) 500 mg tablet Take 1,000 mg by mouth daily. cyanocobalamin 1,000 mcg tablet Take 1,000 mcg by mouth daily. Cholecalciferol, Vitamin D3, (VITAMIN D3) 1,000 unit cap Take 1,000 Units by mouth daily. Patient Follow Up Instructions:          Follow-up Information     Follow up With Specialties Details Why Contact Loren Smith NP Nurse Practitioner   [address] [place] [address]  [phone]          ________________________________________________________________    Risk of deterioration: low  Condition at Discharge:  Stable  __________________________________________________________________    Disposition  Home   ____________________________________________________________________    Code Status: full code___________________________________________________________________      Total time in minutes spent coordinating this discharge (includes going over instructions, follow-up, prescriptions, and preparing report for sign off to her PCP) :  >30 minutes    Signed:  Davon Tarango MD

## 2021-06-28 NOTE — PROGRESS NOTES
Problem: Risk for Spread of Infection  Goal: Prevent transmission of infectious organism to others  Description: Prevent the transmission of infectious organisms to other patients, staff members, and visitors. Outcome: Progressing Towards Goal     Problem: Patient Education:  Go to Education Activity  Goal: Patient/Family Education  Outcome: Progressing Towards Goal     Problem: Airway Clearance - Ineffective  Goal: Achieve or maintain patent airway  Outcome: Progressing Towards Goal     Problem: Gas Exchange - Impaired  Goal: Absence of hypoxia  Outcome: Progressing Towards Goal  Goal: Promote optimal lung function  Outcome: Progressing Towards Goal     Problem: Breathing Pattern - Ineffective  Goal: Ability to achieve and maintain a regular respiratory rate  Outcome: Progressing Towards Goal     Problem:  Body Temperature -  Risk of, Imbalanced  Goal: Ability to maintain a body temperature within defined limits  Outcome: Progressing Towards Goal  Goal: Will regain or maintain usual level of consciousness  Outcome: Progressing Towards Goal  Goal: Complications related to the disease process, condition or treatment will be avoided or minimized  Outcome: Progressing Towards Goal     Problem: Isolation Precautions - Risk of Spread of Infection  Goal: Prevent transmission of infectious organism to others  Outcome: Progressing Towards Goal     Problem: Nutrition Deficits  Goal: Optimize nutrtional status  Outcome: Progressing Towards Goal     Problem: Risk for Fluid Volume Deficit  Goal: Maintain normal heart rhythm  Outcome: Progressing Towards Goal  Goal: Maintain absence of muscle cramping  Outcome: Progressing Towards Goal  Goal: Maintain normal serum potassium, sodium, calcium, phosphorus, and pH  Outcome: Progressing Towards Goal     Problem: Loneliness or Risk for Loneliness  Goal: Demonstrate positive use of time alone when socialization is not possible  Outcome: Progressing Towards Goal     Problem: Fatigue  Goal: Verbalize increase energy and improved vitality  Outcome: Progressing Towards Goal     Problem: Patient Education: Go to Patient Education Activity  Goal: Patient/Family Education  Outcome: Progressing Towards Goal     Problem: Falls - Risk of  Goal: *Absence of Falls  Description: Document Orestes Oquendo Fall Risk and appropriate interventions in the flowsheet.   Outcome: Progressing Towards Goal  Note: Fall Risk Interventions:            Medication Interventions: Teach patient to arise slowly                   Problem: Patient Education: Go to Patient Education Activity  Goal: Patient/Family Education  Outcome: Progressing Towards Goal

## 2021-06-28 NOTE — PROGRESS NOTES
Home Oxygen Test  Date of test: 6/28/21  Time of test: 1045    Sa02 91 % on room air AT REST. Sa02 89 % on room air DURING AMBULATION. Sa02 94 % on 3 Liters DURING AMBULATION. Sa02 91 % on 3 Liters AT REST/AFTER AMBULATION.

## 2021-06-29 ENCOUNTER — PATIENT OUTREACH (OUTPATIENT)
Dept: CASE MANAGEMENT | Age: 56
End: 2021-06-29

## 2021-06-29 NOTE — ACP (ADVANCE CARE PLANNING)
06/29/21  Patient states that his girlfriend Lidya Grewal is his mPOA  [phone]. His secondary POA is son Jean Claude Kumar  [phone]. CTN discussed importance of ACP form and will mail patient blank form to complete. Instructed him to call with questions and to take to PCP office to be scanned into medical record. ---vanessa

## 2021-06-29 NOTE — PROGRESS NOTES
Care Transitions Initial Call    Call within 2 business days of discharge: Yes     Patient: Rita Brady Patient : 1965 MRN: [id number]    Last Discharge [address]       Complaint Diagnosis Description Type Department Provider    21 Positive For Covid-19; Shortness of Breath; Headache Hypoxia . .. ED to Hosp-Admission (Discharged) (ADMIT) Mansoor Altamirano MD; Gilberto Ennis. .. Was this an external facility discharge? No     Challenges to be reviewed by the provider   Additional needs identified to be addressed with provider: yes    Patient needs ACP completed on placed on file---CTN will mail blank form to patient         Method of communication with provider : chart routing    Discussed COVID-19 related testing which was available at this time. Test results were positive. Patient informed of results, if available? yes     Advance Care Planning:   Does patient have an Advance Directive: not on file; education provided. Inpatient Readmission Risk score: Unplanned Readmit Risk Score: 15    Was this a readmission? no       Patients top risk factors for readmission: none at this time    Interventions to address risk factors: Scheduled appointment with PCP-2021, Education of patient/family/caregiver/guardian to support self-management-Covid19 education and Assistance in [facility] Transition Nurse (CTN) contacted the patient by telephone to perform post hospital discharge assessment. Verified name and  with patient as identifiers. Provided introduction to self, and explanation of the CTN role. CTN reviewed discharge instructions, medical action plan and red flags with patient who verbalized understanding. Were discharge instructions available to patient? yes. Reviewed appropriate site of care based on symptoms and resources available to patient including: PCP.  Patient given an opportunity to ask questions and does not have any further questions or concerns at this time. The patient agrees to contact the PCP office for questions related to their healthcare. Medication reconciliation was performed with patient, who verbalizes understanding of administration of home medications. Advised obtaining a 90-day supply of all daily and as-needed medications. Referral to Pharm D needed: no     Home Health/Outpatient orders at discharge: none    Durable Medical Equipment ordered at discharge: None      Covid Risk Education    Educated patient about risk for severe COVID-19 due to risk factors according to CDC guidelines. CTN reviewed discharge instructions, medical action plan and red flag symptoms with the patient who verbalized understanding. Discussed COVID vaccination status: per chart patient not interested in vaccine. Education provided on COVID-19 vaccination as appropriate. Discussed exposure protocols and quarantine with CDC Guidelines. Patient was given an opportunity to verbalize any questions and concerns and agrees to contact CTN or health care provider for questions related to their healthcare. Was patient discharged with a pulse oximeter? no. Patient states he has his own pulse ox at home    Discussed follow-up appointments. If no appointment was previously scheduled, appointment scheduling offered: Appt already made while IP. Is follow up appointment scheduled within 7 days of discharge? yes. [facility] follow up appointment(s): No future appointments. [facility] follow up appointment(s): PCP 6/29 at 3:45    Plan for follow-up call in 5-7 days based on severity of symptoms and risk factors. Plan for next call: symptom management-Covid19  CTN provided contact information for future needs. Goals Addressed                 This Visit's Progress     Prevent complications post hospitalization. 06/29/21  CTN spoke to patient to review discharge instructions/red flags to prevent complications.     Appts:    PCP---  6/29 at 3:45   Patient is aware and will attend     Patient states he is feeling better however still has occasional coughing with mild SOB. Windy Hermes He denies fever, Chest pain, n/v or any new symptoms. He states the symptoms he has are improvements and have not worsened since discharge.  Patient states he has a pulse ox at home and his last 02 sat was 90% on RA. He states the hospital informed him that if his sats remained greater that 87% that this is OK. CTN instructed him if he feels any of his symptoms worsen or if any new symptoms occur to please call his PCP.  CTN will mail blank ACP to patient on Friday as patient states his girlfriend is his MPOA.  Dispatch health information provided. Patient states he has no additional needs or concerns at time of call.  CTN will follow up next week---vanessa

## 2021-07-13 ENCOUNTER — PATIENT OUTREACH (OUTPATIENT)
Dept: CASE MANAGEMENT | Age: 56
End: 2021-07-13

## 2021-07-13 NOTE — PROGRESS NOTES
Goals      Prevent complications post hospitalization. 06/29/21  CTN spoke to patient to review discharge instructions/red flags to prevent complications. Appts:    PCP---  6/29 at 3:45   Patient is aware and will attend     Patient states he is feeling better however still has occasional coughing with mild SOB. [employer]on Records He denies fever, Chest pain, n/v or any new symptoms. He states the symptoms he has are improvements and have not worsened since discharge.  Patient states he has a pulse ox at home and his last 02 sat was 90% on RA. He states the hospital informed him that if his sats remained greater that 87% that this is OK. CTN instructed him if he feels any of his symptoms worsen or if any new symptoms occur to please call his PCP.  CTN will mail blank ACP to patient on Friday as patient states his girlfriend is his MPOA. [address] information provided. Patient states he has no additional needs or concerns at time of call. CTN will follow up next week---vanessa    07/13/21  CTN contacted patient for updates---he reports he is doing very well, his energy is coming back, is having no breathing issues, coughing, SOB or fever. He states he will return next week to work. Patient reports he did see his PCP after discharge and has a follow up on 7/20/2021. Patient received ACP form and states he will complete this. He has no questions at this time. CTN instructed him to call CTN if he needs help with ACP.     CTN will follow up in 7-10 days---vanessa

## 2021-07-28 ENCOUNTER — PATIENT OUTREACH (OUTPATIENT)
Dept: CASE MANAGEMENT | Age: 56
End: 2021-07-28

## 2021-07-28 NOTE — PROGRESS NOTES
Patient has graduated from the Transitions of Care Coordination  program on 7/28/2021. Patient/family has the ability to self-manage at this time Care management goals have been completed. Patient was not referred to the [facility] team for further management. Goals Addressed                 This Visit's Progress     COMPLETED: Prevent complications post hospitalization. 06/29/21  CTN spoke to patient to review discharge instructions/red flags to prevent complications. Appts:    PCP---  6/29 at 3:45   Patient is aware and will attend     Patient states he is feeling better however still has occasional coughing with mild SOB. Edmund Couch He denies fever, Chest pain, n/v or any new symptoms. He states the symptoms he has are improvements and have not worsened since discharge.  Patient states he has a pulse ox at home and his last 02 sat was 90% on RA. He states the hospital informed him that if his sats remained greater that 87% that this is OK. CTN instructed him if he feels any of his symptoms worsen or if any new symptoms occur to please call his PCP.  CTN will mail blank ACP to patient on Friday as patient states his girlfriend is his MPOA. [address] information provided. Patient states he has no additional needs or concerns at time of call. CTN will follow up next week---vanessa    07/13/21  CTN contacted patient for updates---he reports he is doing very well, his energy is coming back, is having no breathing issues, coughing, SOB or fever. He states he will return next week to work. Patient reports he did see his PCP after discharge and has a follow up on 7/20/2021. Patient received ACP form and states he will complete this. He has no questions at this time. CTN instructed him to call CTN if he needs help with ACP. CTN will follow up in 7-10 days---mkrw    07/28/21  CTN spoke to patient today for updates.     Patient states he feels 100% back to normal, denies any respiratory issues at this time. Denies fever, Cp, SOB, GI issues. CTN discussed patient getting COVID19 vaccine when he is able to --patient states he will consider this. Patient reports that he has PCP appt tomorrow for follow up. He also states that he has quit drinking coffee and vaping since his discharge. Patient has not had any readmissions during [facility]---mkrw                Patient has Care Transition Nurse's contact information for any further questions, concerns, or needs. Patients upcoming visits:  No future appointments.

## 2022-03-18 PROBLEM — U07.1 COVID-19: Status: ACTIVE | Noted: 2021-06-22

## 2023-05-11 RX ORDER — ATORVASTATIN CALCIUM 20 MG/1
20 TABLET, FILM COATED ORAL DAILY
COMMUNITY

## 2023-05-11 RX ORDER — ASPIRIN 81 MG/1
81 TABLET ORAL DAILY
COMMUNITY

## 2023-05-11 RX ORDER — LEVOTHYROXINE SODIUM 88 UG/1
88 TABLET ORAL DAILY
COMMUNITY

## 2023-05-11 RX ORDER — ASCORBIC ACID 500 MG
1000 TABLET ORAL DAILY
COMMUNITY